# Patient Record
Sex: FEMALE | Race: WHITE | NOT HISPANIC OR LATINO | Employment: OTHER | URBAN - METROPOLITAN AREA
[De-identification: names, ages, dates, MRNs, and addresses within clinical notes are randomized per-mention and may not be internally consistent; named-entity substitution may affect disease eponyms.]

---

## 2017-03-03 ENCOUNTER — CHARTING TRANS (OUTPATIENT)
Dept: CARDIOLOGY | Age: 62
End: 2017-03-03

## 2017-03-03 ENCOUNTER — IMAGING SERVICES (OUTPATIENT)
Dept: OTHER | Age: 62
End: 2017-03-03

## 2017-04-01 ENCOUNTER — LAB SERVICES (OUTPATIENT)
Dept: OTHER | Age: 62
End: 2017-04-01

## 2017-04-01 LAB
ALBUMIN SERPL BCG-MCNC: 4.3 G/DL (ref 3.6–5.1)
ALP SERPL-CCNC: 129 U/L (ref 45–105)
ALT SERPL W/O P-5'-P-CCNC: 48 U/L (ref 15–43)
AST SERPL-CCNC: 40 U/L (ref 14–43)
BILIRUB SERPL-MCNC: 0.8 MG/DL (ref 0–1.3)
BUN SERPL-MCNC: 12 MG/DL (ref 7–20)
CALCIUM SERPL-MCNC: 9.5 MG/DL (ref 8.6–10.6)
CHLORIDE SERPL-SCNC: 100 MMOL/L (ref 96–107)
CHOLEST SERPL-MCNC: 223 MG/DL (ref 140–200)
CREATININE, SERUM: 0.8 MG/DL (ref 0.5–1.4)
GFR SERPL CREATININE-BSD FRML MDRD: >60 ML/MIN/{1.73M2}
GFR SERPL CREATININE-BSD FRML MDRD: >60 ML/MIN/{1.73M2}
GLUCOSE P FAST SERPL-MCNC: 103 MG/DL (ref 60–100)
HCO3 SERPL-SCNC: 28 MMOL/L (ref 22–32)
HDLC SERPL-MCNC: 84 MG/DL
LDLC SERPL CALC-MCNC: 116 MG/DL (ref 30–100)
POTASSIUM SERPL-SCNC: 4.8 MMOL/L (ref 3.5–5.3)
PROT SERPL-MCNC: 7.3 G/DL (ref 6.4–8.5)
SODIUM SERPL-SCNC: 140 MMOL/L (ref 136–146)
TRIGL SERPL-MCNC: 114 MG/DL (ref 0–200)

## 2017-06-07 ENCOUNTER — CHARTING TRANS (OUTPATIENT)
Dept: OTHER | Age: 62
End: 2017-06-07

## 2017-06-07 ENCOUNTER — IMAGING SERVICES (OUTPATIENT)
Dept: OTHER | Age: 62
End: 2017-06-07

## 2017-06-07 ENCOUNTER — CHARTING TRANS (OUTPATIENT)
Dept: URGENT CARE | Age: 62
End: 2017-06-07

## 2018-03-13 ENCOUNTER — IMAGING SERVICES (OUTPATIENT)
Dept: OTHER | Age: 63
End: 2018-03-13

## 2018-03-13 ENCOUNTER — CHARTING TRANS (OUTPATIENT)
Dept: OTHER | Age: 63
End: 2018-03-13

## 2018-04-02 ENCOUNTER — LAB SERVICES (OUTPATIENT)
Dept: OTHER | Age: 63
End: 2018-04-02

## 2018-04-02 LAB
ALBUMIN SERPL BCG-MCNC: 4.5 G/DL (ref 3.6–5.1)
ALP SERPL-CCNC: 114 U/L (ref 45–105)
ALT SERPL W/O P-5'-P-CCNC: 41 U/L (ref 15–43)
AST SERPL-CCNC: 39 U/L (ref 14–43)
BILIRUB SERPL-MCNC: 0.4 MG/DL (ref 0–1.3)
BUN SERPL-MCNC: 18 MG/DL (ref 7–20)
CALCIUM SERPL-MCNC: 9.9 MG/DL (ref 8.6–10.6)
CHLORIDE SERPL-SCNC: 102 MMOL/L (ref 96–107)
CHOLEST SERPL-MCNC: 212 MG/DL (ref 140–200)
CREATININE, SERUM: 0.8 MG/DL (ref 0.5–1.4)
GFR SERPL CREATININE-BSD FRML MDRD: >60 ML/MIN/{1.73M2}
GFR SERPL CREATININE-BSD FRML MDRD: >60 ML/MIN/{1.73M2}
GLUCOSE P FAST SERPL-MCNC: 91 MG/DL (ref 60–100)
HCO3 SERPL-SCNC: 29 MMOL/L (ref 22–32)
HDLC SERPL-MCNC: 81 MG/DL
LDLC SERPL CALC-MCNC: 105 MG/DL (ref 30–100)
POTASSIUM SERPL-SCNC: 4.8 MMOL/L (ref 3.5–5.3)
PROT SERPL-MCNC: 7.7 G/DL (ref 6.4–8.5)
SODIUM SERPL-SCNC: 140 MMOL/L (ref 136–146)
TRIGL SERPL-MCNC: 130 MG/DL (ref 0–200)

## 2018-04-04 ENCOUNTER — CHARTING TRANS (OUTPATIENT)
Dept: OTHER | Age: 63
End: 2018-04-04

## 2018-04-04 ENCOUNTER — IMAGING SERVICES (OUTPATIENT)
Dept: OTHER | Age: 63
End: 2018-04-04

## 2018-04-06 ENCOUNTER — CHARTING TRANS (OUTPATIENT)
Dept: OTHER | Age: 63
End: 2018-04-06

## 2018-11-23 ENCOUNTER — IMAGING SERVICES (OUTPATIENT)
Dept: OTHER | Age: 63
End: 2018-11-23

## 2018-11-28 VITALS
TEMPERATURE: 101.2 F | DIASTOLIC BLOOD PRESSURE: 100 MMHG | SYSTOLIC BLOOD PRESSURE: 184 MMHG | OXYGEN SATURATION: 98 % | HEART RATE: 105 BPM | RESPIRATION RATE: 16 BRPM

## 2018-11-30 VITALS
WEIGHT: 180 LBS | DIASTOLIC BLOOD PRESSURE: 78 MMHG | OXYGEN SATURATION: 98 % | HEART RATE: 77 BPM | HEIGHT: 67 IN | RESPIRATION RATE: 16 BRPM | BODY MASS INDEX: 28.25 KG/M2 | SYSTOLIC BLOOD PRESSURE: 146 MMHG

## 2019-01-01 ENCOUNTER — EXTERNAL RECORD (OUTPATIENT)
Dept: HEALTH INFORMATION MANAGEMENT | Facility: OTHER | Age: 64
End: 2019-01-01

## 2019-01-24 ENCOUNTER — IMAGING SERVICES (OUTPATIENT)
Dept: OTHER | Age: 64
End: 2019-01-24

## 2019-02-14 RX ORDER — METOPROLOL SUCCINATE 25 MG/1
TABLET, EXTENDED RELEASE ORAL
COMMUNITY
Start: 2018-04-13 | End: 2019-05-21 | Stop reason: DRUGHIGH

## 2019-02-14 RX ORDER — ATORVASTATIN CALCIUM 20 MG/1
TABLET, FILM COATED ORAL
COMMUNITY
Start: 2018-04-13 | End: 2019-03-15 | Stop reason: SDUPTHER

## 2019-02-28 ENCOUNTER — TELEPHONE (OUTPATIENT)
Dept: FAMILY MEDICINE | Age: 64
End: 2019-02-28

## 2019-02-28 ENCOUNTER — OFFICE VISIT (OUTPATIENT)
Dept: FAMILY MEDICINE | Age: 64
End: 2019-02-28

## 2019-02-28 VITALS
DIASTOLIC BLOOD PRESSURE: 70 MMHG | BODY MASS INDEX: 24.91 KG/M2 | HEIGHT: 66 IN | HEART RATE: 72 BPM | SYSTOLIC BLOOD PRESSURE: 130 MMHG | WEIGHT: 155 LBS

## 2019-02-28 DIAGNOSIS — I65.23 BILATERAL CAROTID ARTERY STENOSIS: ICD-10-CM

## 2019-02-28 DIAGNOSIS — G93.0 ARACHNOID CYST: ICD-10-CM

## 2019-02-28 DIAGNOSIS — R42 DIZZINESS: Primary | ICD-10-CM

## 2019-02-28 DIAGNOSIS — E78.5 HYPERLIPIDEMIA, UNSPECIFIED HYPERLIPIDEMIA TYPE: ICD-10-CM

## 2019-02-28 DIAGNOSIS — I10 ESSENTIAL HYPERTENSION: ICD-10-CM

## 2019-02-28 PROCEDURE — 99214 OFFICE O/P EST MOD 30 MIN: CPT | Performed by: INTERNAL MEDICINE

## 2019-02-28 PROCEDURE — 3078F DIAST BP <80 MM HG: CPT | Performed by: INTERNAL MEDICINE

## 2019-02-28 ASSESSMENT — PATIENT HEALTH QUESTIONNAIRE - PHQ9
1. LITTLE INTEREST OR PLEASURE IN DOING THINGS: NOT AT ALL
2. FEELING DOWN, DEPRESSED OR HOPELESS: NOT AT ALL
SUM OF ALL RESPONSES TO PHQ9 QUESTIONS 1 AND 2: 0
SUM OF ALL RESPONSES TO PHQ9 QUESTIONS 1 AND 2: 0

## 2019-03-01 ENCOUNTER — TELEPHONE (OUTPATIENT)
Dept: FAMILY MEDICINE | Age: 64
End: 2019-03-01

## 2019-03-01 ENCOUNTER — LAB SERVICES (OUTPATIENT)
Dept: LAB | Age: 64
End: 2019-03-01

## 2019-03-01 DIAGNOSIS — R42 DIZZINESS: ICD-10-CM

## 2019-03-01 DIAGNOSIS — I10 ESSENTIAL HYPERTENSION: ICD-10-CM

## 2019-03-01 LAB
ALBUMIN SERPL-MCNC: 4.6 G/DL (ref 3.6–5.1)
ALP SERPL-CCNC: 81 U/L (ref 45–130)
ALT SERPL W/O P-5'-P-CCNC: 24 U/L (ref 4–38)
AST SERPL-CCNC: 32 U/L (ref 14–43)
BASOPHIL %: 0.3 % (ref 0–1.2)
BASOPHIL ABSOLUTE #: 0 10*3/UL (ref 0–0.1)
BILIRUB SERPL-MCNC: 1 MG/DL (ref 0–1.3)
BUN SERPL-MCNC: 27 MG/DL (ref 7–20)
CALCIUM SERPL-MCNC: 10.1 MG/DL (ref 8.6–10.6)
CHLORIDE SERPL-SCNC: 101 MMOL/L (ref 96–107)
CO2 SERPL-SCNC: 30 MMOL/L (ref 22–32)
CREAT SERPL-MCNC: 0.8 MG/DL (ref 0.5–1.4)
DIFFERENTIAL TYPE: ABNORMAL
EOSINOPHIL %: 1.5 % (ref 0–10)
EOSINOPHIL ABSOLUTE #: 0.1 10*3/UL (ref 0–0.5)
GFR SERPL CREATININE-BSD FRML MDRD: >60 ML/MIN/{1.73M2}
GFR SERPL CREATININE-BSD FRML MDRD: >60 ML/MIN/{1.73M2}
GLUCOSE SERPL-MCNC: 102 MG/DL (ref 70–200)
HEMATOCRIT: 43.3 % (ref 34–45)
HEMOGLOBIN: 14.8 G/DL (ref 11.2–15.7)
LYMPH PERCENT: 25.1 % (ref 20.5–51.1)
LYMPHOCYTE ABSOLUTE #: 2.2 10*3/UL (ref 1.2–3.4)
MEAN CORPUSCULAR HGB CONCENTRATION: 34.2 % (ref 32–36)
MEAN CORPUSCULAR HGB: 32.2 PG (ref 27–34)
MEAN CORPUSCULAR VOLUME: 94.1 FL (ref 79–95)
MEAN PLATELET VOLUME: 9.6 FL (ref 8.6–12.4)
MONOCYTE ABSOLUTE #: 1 10*3/UL (ref 0.2–0.9)
MONOCYTE PERCENT: 12 % (ref 4.3–12.9)
NEUTROPHIL ABSOLUTE #: 5.3 10*3/UL (ref 1.4–6.5)
NEUTROPHIL PERCENT: 61.1 % (ref 34–73.5)
PLATELET COUNT: 253 10*3/UL (ref 150–400)
POTASSIUM SERPL-SCNC: 4 MMOL/L (ref 3.5–5.3)
PROT SERPL-MCNC: 7.8 G/DL (ref 6.4–8.5)
RED BLOOD CELL COUNT: 4.6 10*6/UL (ref 3.7–5.2)
RED CELL DISTRIBUTION WIDTH: 11.6 % (ref 11.3–14.8)
SODIUM SERPL-SCNC: 137 MMOL/L (ref 136–146)
WHITE BLOOD CELL COUNT: 8.6 10*3/UL (ref 4–10)

## 2019-03-01 PROCEDURE — 85025 COMPLETE CBC W/AUTO DIFF WBC: CPT | Performed by: INTERNAL MEDICINE

## 2019-03-01 PROCEDURE — 80053 COMPREHEN METABOLIC PANEL: CPT | Performed by: INTERNAL MEDICINE

## 2019-03-01 PROCEDURE — 36415 COLL VENOUS BLD VENIPUNCTURE: CPT | Performed by: INTERNAL MEDICINE

## 2019-03-06 ENCOUNTER — ANCILLARY PROCEDURE (OUTPATIENT)
Dept: CARDIOLOGY | Age: 64
End: 2019-03-06
Attending: INTERNAL MEDICINE

## 2019-03-06 VITALS
RESPIRATION RATE: 16 BRPM | SYSTOLIC BLOOD PRESSURE: 132 MMHG | WEIGHT: 168 LBS | DIASTOLIC BLOOD PRESSURE: 78 MMHG | BODY MASS INDEX: 26.37 KG/M2 | OXYGEN SATURATION: 96 % | HEART RATE: 79 BPM | HEIGHT: 67 IN

## 2019-03-06 DIAGNOSIS — R42 DIZZINESS: ICD-10-CM

## 2019-03-06 DIAGNOSIS — G93.0 ARACHNOID CYST: ICD-10-CM

## 2019-03-06 DIAGNOSIS — I65.23 BILATERAL CAROTID ARTERY STENOSIS: ICD-10-CM

## 2019-03-06 PROCEDURE — 93880 EXTRACRANIAL BILAT STUDY: CPT | Performed by: SURGERY

## 2019-03-11 ENCOUNTER — TELEPHONE (OUTPATIENT)
Dept: FAMILY MEDICINE | Age: 64
End: 2019-03-11

## 2019-03-11 DIAGNOSIS — R11.0 NAUSEA: Primary | ICD-10-CM

## 2019-03-11 DIAGNOSIS — R19.5 LOOSE STOOLS: ICD-10-CM

## 2019-03-15 ENCOUNTER — OFFICE VISIT (OUTPATIENT)
Dept: CARDIOLOGY | Age: 64
End: 2019-03-15

## 2019-03-15 VITALS
SYSTOLIC BLOOD PRESSURE: 136 MMHG | BODY MASS INDEX: 24.75 KG/M2 | HEIGHT: 66 IN | RESPIRATION RATE: 16 BRPM | OXYGEN SATURATION: 100 % | HEART RATE: 81 BPM | DIASTOLIC BLOOD PRESSURE: 84 MMHG | WEIGHT: 154 LBS

## 2019-03-15 DIAGNOSIS — I25.10 CORONARY ARTERY DISEASE INVOLVING NATIVE CORONARY ARTERY OF NATIVE HEART WITHOUT ANGINA PECTORIS: Primary | ICD-10-CM

## 2019-03-15 DIAGNOSIS — E78.49 OTHER HYPERLIPIDEMIA: ICD-10-CM

## 2019-03-15 DIAGNOSIS — R42 DIZZINESS: ICD-10-CM

## 2019-03-15 PROCEDURE — 93000 ELECTROCARDIOGRAM COMPLETE: CPT | Performed by: INTERNAL MEDICINE

## 2019-03-15 PROCEDURE — 3075F SYST BP GE 130 - 139MM HG: CPT | Performed by: INTERNAL MEDICINE

## 2019-03-15 PROCEDURE — 99214 OFFICE O/P EST MOD 30 MIN: CPT | Performed by: INTERNAL MEDICINE

## 2019-03-15 PROCEDURE — 3079F DIAST BP 80-89 MM HG: CPT | Performed by: INTERNAL MEDICINE

## 2019-03-15 RX ORDER — ATORVASTATIN CALCIUM 40 MG/1
40 TABLET, FILM COATED ORAL NIGHTLY
Qty: 90 TABLET | Refills: 3 | Status: SHIPPED | OUTPATIENT
Start: 2019-03-15 | End: 2020-03-07 | Stop reason: SDUPTHER

## 2019-03-18 ENCOUNTER — IMAGING SERVICES (OUTPATIENT)
Dept: MRI IMAGING | Age: 64
End: 2019-03-18

## 2019-03-18 ENCOUNTER — IMAGING SERVICES (OUTPATIENT)
Dept: CT IMAGING | Age: 64
End: 2019-03-18

## 2019-03-20 ENCOUNTER — IMAGING SERVICES (OUTPATIENT)
Dept: MRI IMAGING | Age: 64
End: 2019-03-20

## 2019-03-20 DIAGNOSIS — I65.23 BILATERAL CAROTID ARTERY STENOSIS: ICD-10-CM

## 2019-03-20 DIAGNOSIS — G93.0 ARACHNOID CYST: ICD-10-CM

## 2019-03-20 DIAGNOSIS — R42 DIZZINESS: ICD-10-CM

## 2019-03-20 PROCEDURE — X1094 NO CHARGE VISIT: HCPCS

## 2019-03-20 PROCEDURE — 70544 MR ANGIOGRAPHY HEAD W/O DYE: CPT | Performed by: RADIOLOGY

## 2019-03-20 PROCEDURE — A9585 GADOBUTROL INJECTION: HCPCS | Performed by: RADIOLOGY

## 2019-03-20 PROCEDURE — 70553 MRI BRAIN STEM W/O & W/DYE: CPT | Performed by: RADIOLOGY

## 2019-03-20 RX ORDER — GADOBUTROL 604.72 MG/ML
30 INJECTION INTRAVENOUS ONCE
Status: COMPLETED | OUTPATIENT
Start: 2019-03-20 | End: 2019-03-20

## 2019-03-20 RX ADMIN — GADOBUTROL 7 ML: 604.72 INJECTION INTRAVENOUS at 07:45

## 2019-03-27 ENCOUNTER — LAB SERVICES (OUTPATIENT)
Dept: LAB | Age: 64
End: 2019-03-27

## 2019-03-27 DIAGNOSIS — E78.49 OTHER HYPERLIPIDEMIA: ICD-10-CM

## 2019-03-27 DIAGNOSIS — I25.10 CORONARY ARTERY DISEASE INVOLVING NATIVE CORONARY ARTERY OF NATIVE HEART WITHOUT ANGINA PECTORIS: ICD-10-CM

## 2019-03-27 LAB
CHOLEST SERPL-MCNC: 226 MG/DL (ref 140–200)
HDLC SERPL-MCNC: 92 MG/DL
LDLC SERPL CALC-MCNC: 112 MG/DL (ref 30–100)
TRIGL SERPL-MCNC: 112 MG/DL (ref 0–200)

## 2019-03-27 PROCEDURE — 36415 COLL VENOUS BLD VENIPUNCTURE: CPT | Performed by: INTERNAL MEDICINE

## 2019-03-27 PROCEDURE — 80061 LIPID PANEL: CPT | Performed by: INTERNAL MEDICINE

## 2019-03-28 ENCOUNTER — ANCILLARY PROCEDURE (OUTPATIENT)
Dept: CARDIOLOGY | Age: 64
End: 2019-03-28
Attending: INTERNAL MEDICINE

## 2019-03-28 PROCEDURE — 93306 TTE W/DOPPLER COMPLETE: CPT | Performed by: INTERNAL MEDICINE

## 2019-03-29 DIAGNOSIS — E78.5 HYPERLIPIDEMIA, UNSPECIFIED HYPERLIPIDEMIA TYPE: Primary | ICD-10-CM

## 2019-03-31 ENCOUNTER — TELEPHONE (OUTPATIENT)
Dept: CARDIOLOGY | Age: 64
End: 2019-03-31

## 2019-04-10 ENCOUNTER — OFFICE VISIT (OUTPATIENT)
Dept: GASTROENTEROLOGY | Age: 64
End: 2019-04-10

## 2019-04-10 ENCOUNTER — OFFICE VISIT (OUTPATIENT)
Dept: FAMILY MEDICINE | Age: 64
End: 2019-04-10

## 2019-04-10 VITALS
WEIGHT: 159 LBS | HEIGHT: 66 IN | DIASTOLIC BLOOD PRESSURE: 74 MMHG | BODY MASS INDEX: 25.55 KG/M2 | SYSTOLIC BLOOD PRESSURE: 118 MMHG

## 2019-04-10 VITALS
SYSTOLIC BLOOD PRESSURE: 142 MMHG | HEIGHT: 66 IN | DIASTOLIC BLOOD PRESSURE: 80 MMHG | BODY MASS INDEX: 25.55 KG/M2 | HEART RATE: 68 BPM | WEIGHT: 159 LBS

## 2019-04-10 DIAGNOSIS — Z01.419 WELL WOMAN EXAM: ICD-10-CM

## 2019-04-10 DIAGNOSIS — Z12.31 VISIT FOR SCREENING MAMMOGRAM: Primary | ICD-10-CM

## 2019-04-10 DIAGNOSIS — Z23 NEED FOR TDAP VACCINATION: ICD-10-CM

## 2019-04-10 DIAGNOSIS — Z86.010 PERSONAL HISTORY OF COLONIC POLYPS: ICD-10-CM

## 2019-04-10 DIAGNOSIS — I65.23 BILATERAL CAROTID ARTERY STENOSIS: ICD-10-CM

## 2019-04-10 DIAGNOSIS — R11.0 CHRONIC NAUSEA: Primary | ICD-10-CM

## 2019-04-10 DIAGNOSIS — R42 VERTIGO: ICD-10-CM

## 2019-04-10 DIAGNOSIS — R73.01 IMPAIRED FASTING GLUCOSE: ICD-10-CM

## 2019-04-10 DIAGNOSIS — E78.5 HYPERLIPIDEMIA, UNSPECIFIED HYPERLIPIDEMIA TYPE: ICD-10-CM

## 2019-04-10 DIAGNOSIS — F10.10 EXCESSIVE DRINKING ALCOHOL: ICD-10-CM

## 2019-04-10 DIAGNOSIS — N95.1 SYMPTOMATIC MENOPAUSAL OR FEMALE CLIMACTERIC STATES: ICD-10-CM

## 2019-04-10 DIAGNOSIS — I10 ESSENTIAL HYPERTENSION: ICD-10-CM

## 2019-04-10 PROCEDURE — 90471 IMMUNIZATION ADMIN: CPT

## 2019-04-10 PROCEDURE — 88142 CYTOPATH C/V THIN LAYER: CPT | Performed by: INTERNAL MEDICINE

## 2019-04-10 PROCEDURE — 99214 OFFICE O/P EST MOD 30 MIN: CPT | Performed by: INTERNAL MEDICINE

## 2019-04-10 PROCEDURE — 3079F DIAST BP 80-89 MM HG: CPT | Performed by: INTERNAL MEDICINE

## 2019-04-10 PROCEDURE — 3078F DIAST BP <80 MM HG: CPT | Performed by: INTERNAL MEDICINE

## 2019-04-10 PROCEDURE — 99396 PREV VISIT EST AGE 40-64: CPT | Performed by: INTERNAL MEDICINE

## 2019-04-10 PROCEDURE — 3077F SYST BP >= 140 MM HG: CPT | Performed by: INTERNAL MEDICINE

## 2019-04-10 PROCEDURE — 90715 TDAP VACCINE 7 YRS/> IM: CPT

## 2019-04-10 PROCEDURE — 3074F SYST BP LT 130 MM HG: CPT | Performed by: INTERNAL MEDICINE

## 2019-04-10 RX ORDER — SODIUM, POTASSIUM,MAG SULFATES 17.5-3.13G
SOLUTION, RECONSTITUTED, ORAL ORAL
Qty: 12 OZ | Refills: 0 | Status: SHIPPED | OUTPATIENT
Start: 2019-04-10 | End: 2019-08-31

## 2019-04-10 RX ORDER — SIMETHICONE 125 MG
TABLET,CHEWABLE ORAL
Qty: 2 TABLET | Refills: 0 | Status: SHIPPED | OUTPATIENT
Start: 2019-04-10 | End: 2019-08-31

## 2019-04-10 RX ORDER — BISACODYL 5 MG/1
TABLET, DELAYED RELEASE ORAL
Qty: 2 TABLET | Refills: 0 | Status: SHIPPED | OUTPATIENT
Start: 2019-04-10 | End: 2019-08-31

## 2019-04-10 ASSESSMENT — PATIENT HEALTH QUESTIONNAIRE - PHQ9
1. LITTLE INTEREST OR PLEASURE IN DOING THINGS: NOT AT ALL
SUM OF ALL RESPONSES TO PHQ9 QUESTIONS 1 AND 2: 0
2. FEELING DOWN, DEPRESSED OR HOPELESS: NOT AT ALL
SUM OF ALL RESPONSES TO PHQ9 QUESTIONS 1 AND 2: 0

## 2019-04-19 LAB — AP REPORT: NORMAL

## 2019-04-23 LAB — HPV I/H RISK 4 DNA CVX QL NAA+PROBE: NORMAL

## 2019-04-30 ENCOUNTER — TELEPHONE (OUTPATIENT)
Dept: FAMILY MEDICINE | Age: 64
End: 2019-04-30

## 2019-04-30 RX ORDER — ONDANSETRON 4 MG/1
4 TABLET, FILM COATED ORAL EVERY 8 HOURS PRN
Qty: 20 TABLET | Refills: 0 | Status: SHIPPED | OUTPATIENT
Start: 2019-04-30 | End: 2022-01-13 | Stop reason: ALTCHOICE

## 2019-05-01 ENCOUNTER — TELEPHONE (OUTPATIENT)
Dept: FAMILY MEDICINE | Age: 64
End: 2019-05-01

## 2019-05-15 ENCOUNTER — WALK IN (OUTPATIENT)
Dept: URGENT CARE | Age: 64
End: 2019-05-15

## 2019-05-15 VITALS
HEART RATE: 94 BPM | RESPIRATION RATE: 24 BRPM | SYSTOLIC BLOOD PRESSURE: 198 MMHG | OXYGEN SATURATION: 99 % | DIASTOLIC BLOOD PRESSURE: 118 MMHG | TEMPERATURE: 98.3 F

## 2019-05-15 DIAGNOSIS — R42 VERTIGO: Primary | ICD-10-CM

## 2019-05-15 DIAGNOSIS — I16.0 HYPERTENSIVE URGENCY: ICD-10-CM

## 2019-05-15 DIAGNOSIS — R11.2 NAUSEA AND VOMITING, INTRACTABILITY OF VOMITING NOT SPECIFIED, UNSPECIFIED VOMITING TYPE: ICD-10-CM

## 2019-05-15 DIAGNOSIS — E86.0 DEHYDRATION: ICD-10-CM

## 2019-05-15 PROCEDURE — 99214 OFFICE O/P EST MOD 30 MIN: CPT | Performed by: FAMILY MEDICINE

## 2019-05-15 PROCEDURE — 96374 THER/PROPH/DIAG INJ IV PUSH: CPT | Performed by: FAMILY MEDICINE

## 2019-05-15 PROCEDURE — 96360 HYDRATION IV INFUSION INIT: CPT | Performed by: FAMILY MEDICINE

## 2019-05-15 RX ORDER — ONDANSETRON 2 MG/ML
4 INJECTION INTRAMUSCULAR; INTRAVENOUS ONCE
Status: COMPLETED | OUTPATIENT
Start: 2019-05-15 | End: 2019-05-15

## 2019-05-15 RX ADMIN — ONDANSETRON 4 MG: 2 INJECTION INTRAMUSCULAR; INTRAVENOUS at 10:08

## 2019-05-16 ENCOUNTER — EXTERNAL RECORD (OUTPATIENT)
Dept: OTHER | Age: 64
End: 2019-05-16

## 2019-05-16 LAB
*MEAN CORPUSCULAR HGB CONC: 34.6 G/DL (ref 32.5–35.8)
ANION GAP: 11 MEQ/L (ref 8–16)
BASOPHIL AUTOMATED: 0.2 %
BASOPHILS: 0 (ref 0–0.2)
BLOOD UREA NITROGEN (BUN): 18 MG/DL (ref 7–25)
BUN/CREATININE RATIO: 30.5 (ref 7.4–23)
CALCIUM, SERUM: 9.1 MG/DL (ref 8.6–10.3)
CARBON DIOXIDE: 23 MMOL/L (ref 21–31)
CHLORIDE, SERUM: 103 MMOL/L (ref 98–107)
CREATININE: 0.59 MG/DL (ref 0.6–1.2)
EOSINOPHIL AUTOMATED: 0 %
EOSINOPHILS: 0 (ref 0–0.5)
EST GLOMERULAR FILTRATION RATE: > 60 1.73M SQ
GLUCOSE: 124 MG/DL (ref 70–99)
HEMATOCRIT: 42 % (ref 34.7–45.1)
HEMOGLOBIN: 14.5 GM/DL (ref 12–15.3)
K (POTASSIUM, SERUM): 3.9 MMOL/L (ref 3.5–5.1)
LYMPHOCYTE AUTOMATED: 16.5 %
LYMPHOCYTES: 0.9 (ref 0.6–3.4)
MEAN CORPUSCULAR HGB: 31.8 PG (ref 26–34)
MEAN CORPUSCULAR VOL: 91.9 FL (ref 80–100)
MEAN PLATELET VOLUME: 7.4 FL (ref 6.8–10.2)
MONOCYTE AUTOMATED: 3.6 %
MONOCYTES: 0.2 (ref 0.3–1)
NA (SODIUM, SERUM): 137 MMOL/L (ref 133–144)
NEUTROPHIL ABSOLUTE: 4.4 (ref 1.7–7.7)
NEUTROPHIL AUTOMATED: 79.7 %
PLATELET COUNT: 242 K/MM3 (ref 150–450)
RED BLOOD CELL COUNT: 4.57 M/MM3 (ref 3.63–5.04)
RED CELL DISTRIBUTIO: 12.4 % (ref 11.9–15.9)
WHITE BLOOD CELL COU: 5.5 K/MM3 (ref 4–11)

## 2019-05-17 ENCOUNTER — TELEPHONE (OUTPATIENT)
Dept: INTERNAL MEDICINE | Age: 64
End: 2019-05-17

## 2019-05-17 ENCOUNTER — TELEPHONE (OUTPATIENT)
Dept: OTOLARYNGOLOGY | Age: 64
End: 2019-05-17

## 2019-05-20 ENCOUNTER — TELEPHONE (OUTPATIENT)
Dept: FAMILY MEDICINE | Age: 64
End: 2019-05-20

## 2019-05-21 ENCOUNTER — OFFICE VISIT (OUTPATIENT)
Dept: FAMILY MEDICINE | Age: 64
End: 2019-05-21

## 2019-05-21 VITALS
DIASTOLIC BLOOD PRESSURE: 72 MMHG | BODY MASS INDEX: 25.88 KG/M2 | HEART RATE: 82 BPM | WEIGHT: 161 LBS | HEIGHT: 66 IN | SYSTOLIC BLOOD PRESSURE: 136 MMHG

## 2019-05-21 DIAGNOSIS — R42 VERTIGO: ICD-10-CM

## 2019-05-21 DIAGNOSIS — I10 ESSENTIAL HYPERTENSION: Primary | ICD-10-CM

## 2019-05-21 DIAGNOSIS — I65.29 STENOSIS OF CAROTID ARTERY, UNSPECIFIED LATERALITY: ICD-10-CM

## 2019-05-21 PROCEDURE — 3078F DIAST BP <80 MM HG: CPT | Performed by: INTERNAL MEDICINE

## 2019-05-21 PROCEDURE — 99214 OFFICE O/P EST MOD 30 MIN: CPT | Performed by: INTERNAL MEDICINE

## 2019-05-21 PROCEDURE — 3075F SYST BP GE 130 - 139MM HG: CPT | Performed by: INTERNAL MEDICINE

## 2019-05-21 RX ORDER — MECLIZINE HYDROCHLORIDE 25 MG/1
25 TABLET ORAL 3 TIMES DAILY PRN
Qty: 40 TABLET | Refills: 0 | Status: SHIPPED | OUTPATIENT
Start: 2019-05-21 | End: 2023-02-02 | Stop reason: ALTCHOICE

## 2019-05-21 RX ORDER — METOPROLOL SUCCINATE 50 MG/1
50 TABLET, EXTENDED RELEASE ORAL DAILY
Qty: 90 TABLET | Refills: 2 | Status: SHIPPED | OUTPATIENT
Start: 2019-05-21 | End: 2020-02-13 | Stop reason: SDUPTHER

## 2019-05-23 ENCOUNTER — APPOINTMENT (OUTPATIENT)
Dept: FAMILY MEDICINE | Age: 64
End: 2019-05-23

## 2019-05-23 ENCOUNTER — APPOINTMENT (OUTPATIENT)
Dept: OTOLARYNGOLOGY | Age: 64
End: 2019-05-23

## 2019-05-23 ENCOUNTER — OFFICE VISIT (OUTPATIENT)
Dept: OTOLARYNGOLOGY | Age: 64
End: 2019-05-23

## 2019-05-23 ENCOUNTER — OFFICE VISIT (OUTPATIENT)
Dept: AUDIOLOGY | Age: 64
End: 2019-05-23

## 2019-05-23 VITALS — WEIGHT: 161 LBS | BODY MASS INDEX: 25.88 KG/M2 | HEIGHT: 66 IN

## 2019-05-23 DIAGNOSIS — R42 DIZZINESS: ICD-10-CM

## 2019-05-23 DIAGNOSIS — J34.2 DEVIATED NASAL SEPTUM: ICD-10-CM

## 2019-05-23 DIAGNOSIS — H90.3 SENSORINEURAL HEARING LOSS (SNHL) OF BOTH EARS: Primary | ICD-10-CM

## 2019-05-23 DIAGNOSIS — R42 VERTIGO: ICD-10-CM

## 2019-05-23 DIAGNOSIS — H91.90 HEARING LOSS, UNSPECIFIED HEARING LOSS TYPE, UNSPECIFIED LATERALITY: Primary | ICD-10-CM

## 2019-05-23 PROCEDURE — 92567 TYMPANOMETRY: CPT | Performed by: AUDIOLOGIST

## 2019-05-23 PROCEDURE — 3078F DIAST BP <80 MM HG: CPT | Performed by: OTOLARYNGOLOGY

## 2019-05-23 PROCEDURE — 99203 OFFICE O/P NEW LOW 30 MIN: CPT | Performed by: OTOLARYNGOLOGY

## 2019-05-23 PROCEDURE — 3074F SYST BP LT 130 MM HG: CPT | Performed by: OTOLARYNGOLOGY

## 2019-05-23 PROCEDURE — 92557 COMPREHENSIVE HEARING TEST: CPT | Performed by: AUDIOLOGIST

## 2019-06-06 ENCOUNTER — OFFICE VISIT (OUTPATIENT)
Dept: NEUROLOGY | Facility: CLINIC | Age: 64
End: 2019-06-06
Payer: COMMERCIAL

## 2019-06-06 ENCOUNTER — TELEPHONE (OUTPATIENT)
Dept: NEUROLOGY | Facility: CLINIC | Age: 64
End: 2019-06-06

## 2019-06-06 VITALS — DIASTOLIC BLOOD PRESSURE: 72 MMHG | SYSTOLIC BLOOD PRESSURE: 127 MMHG | WEIGHT: 155 LBS

## 2019-06-06 DIAGNOSIS — H83.8X3 SUPERIOR SEMICIRCULAR CANAL DEHISCENCE OF BOTH EARS: ICD-10-CM

## 2019-06-06 DIAGNOSIS — H70.92 MASTOIDITIS OF LEFT SIDE: ICD-10-CM

## 2019-06-06 DIAGNOSIS — H81.13 BENIGN PAROXYSMAL POSITIONAL VERTIGO DUE TO BILATERAL VESTIBULAR DISORDER: Primary | ICD-10-CM

## 2019-06-06 PROCEDURE — 99204 OFFICE O/P NEW MOD 45 MIN: CPT | Performed by: OTHER

## 2019-06-06 RX ORDER — METOPROLOL SUCCINATE 50 MG/1
50 TABLET, EXTENDED RELEASE ORAL
COMMUNITY
Start: 2019-05-21

## 2019-06-06 RX ORDER — ATORVASTATIN CALCIUM 20 MG/1
40 TABLET, FILM COATED ORAL
Refills: 3 | COMMUNITY
Start: 2019-01-07 | End: 2019-07-19 | Stop reason: DRUGHIGH

## 2019-06-06 RX ORDER — DIAZEPAM 2 MG/1
2 TABLET ORAL 3 TIMES DAILY PRN
Qty: 30 TABLET | Refills: 3 | Status: SHIPPED | OUTPATIENT
Start: 2019-06-06 | End: 2022-01-07

## 2019-06-06 RX ORDER — SIMETHICONE 125 MG
125 TABLET,CHEWABLE ORAL
COMMUNITY
Start: 2019-04-10 | End: 2019-07-19

## 2019-06-06 RX ORDER — MECLIZINE HYDROCHLORIDE 25 MG/1
25 TABLET ORAL
COMMUNITY
Start: 2019-05-21 | End: 2019-07-19

## 2019-06-06 RX ORDER — ONDANSETRON 4 MG/1
4 TABLET, ORALLY DISINTEGRATING ORAL
Refills: 0 | COMMUNITY
Start: 2019-02-27 | End: 2020-02-21

## 2019-06-06 RX ORDER — OMEGA-3-ACID ETHYL ESTERS 1 G/1
1 CAPSULE, LIQUID FILLED ORAL DAILY
COMMUNITY

## 2019-06-06 NOTE — H&P
[address] [place] [address] with [facility]  6/6/2019    4:32 PM      The patient is a 21-year-old female who comes in for evaluation of her dizziness.   Several years ago she experienced a 2-week prolonged vertiginous Number of children: Not on file      Years of education: Not on file      Highest education level: Not on file    Tobacco Use      Smoking status: Never Smoker      Smokeless tobacco: Never Used    Substance and Sexual Activity      Alcohol use:  Yes

## 2019-06-10 ENCOUNTER — TELEPHONE (OUTPATIENT)
Dept: NEUROLOGY | Facility: CLINIC | Age: 64
End: 2019-06-10

## 2019-06-11 NOTE — TELEPHONE ENCOUNTER
Pts MRI prior authorization approved. Pt will contact Pamela Harrison to get fax number for us to fax orders.     Fax:  [phone]  Fax sent  Fax confirmed

## 2019-06-14 ENCOUNTER — APPOINTMENT (OUTPATIENT)
Dept: GASTROENTEROLOGY | Age: 64
End: 2019-06-14
Attending: INTERNAL MEDICINE

## 2019-06-14 DIAGNOSIS — Z86.010 PERSONAL HISTORY OF COLONIC POLYPS: ICD-10-CM

## 2019-06-14 DIAGNOSIS — Z86.010 HISTORY OF COLONIC POLYPS: ICD-10-CM

## 2019-06-14 DIAGNOSIS — R11.0 NAUSEA: ICD-10-CM

## 2019-06-14 DIAGNOSIS — D12.3 BENIGN NEOPLASM OF TRANSVERSE COLON: ICD-10-CM

## 2019-06-14 DIAGNOSIS — R11.0 NAUSEA: Primary | ICD-10-CM

## 2019-06-14 PROCEDURE — 88305 TISSUE EXAM BY PATHOLOGIST: CPT | Performed by: PATHOLOGY

## 2019-06-14 PROCEDURE — 45380 COLONOSCOPY AND BIOPSY: CPT | Performed by: INTERNAL MEDICINE

## 2019-06-14 PROCEDURE — 43239 EGD BIOPSY SINGLE/MULTIPLE: CPT | Performed by: INTERNAL MEDICINE

## 2019-06-18 ENCOUNTER — IMAGING SERVICES (OUTPATIENT)
Dept: MRI IMAGING | Age: 64
End: 2019-06-18

## 2019-06-18 DIAGNOSIS — H83.8X3 SUPERIOR SEMICIRCULAR CANAL DEHISCENCE OF BOTH EARS: ICD-10-CM

## 2019-06-18 DIAGNOSIS — H81.13 BENIGN PAROXYSMAL POSITIONAL VERTIGO DUE TO BILATERAL VESTIBULAR DISORDER: ICD-10-CM

## 2019-06-18 DIAGNOSIS — H70.92 MASTOIDITIS OF LEFT SIDE: ICD-10-CM

## 2019-06-18 PROCEDURE — A9585 GADOBUTROL INJECTION: HCPCS

## 2019-06-18 PROCEDURE — 70553 MRI BRAIN STEM W/O & W/DYE: CPT | Performed by: RADIOLOGY

## 2019-06-18 RX ORDER — GADOBUTROL 604.72 MG/ML
30 INJECTION INTRAVENOUS ONCE
Status: COMPLETED | OUTPATIENT
Start: 2019-06-18 | End: 2019-06-18

## 2019-06-18 RX ADMIN — GADOBUTROL 7 ML: 604.72 INJECTION INTRAVENOUS at 17:30

## 2019-06-19 ENCOUNTER — TELEPHONE (OUTPATIENT)
Dept: NEUROLOGY | Facility: CLINIC | Age: 64
End: 2019-06-19

## 2019-06-19 LAB — AP REPORT: NORMAL

## 2019-06-19 NOTE — TELEPHONE ENCOUNTER
I am in remote location, can you past or paraphrase the Impression? So I can call from here.   Dr Karina Chandler

## 2019-06-20 ENCOUNTER — TELEPHONE (OUTPATIENT)
Dept: GASTROENTEROLOGY | Age: 64
End: 2019-06-20

## 2019-07-09 ENCOUNTER — LAB SERVICES (OUTPATIENT)
Dept: LAB | Age: 64
End: 2019-07-09

## 2019-07-09 DIAGNOSIS — E78.5 HYPERLIPIDEMIA, UNSPECIFIED HYPERLIPIDEMIA TYPE: ICD-10-CM

## 2019-07-09 DIAGNOSIS — Z01.419 WELL WOMAN EXAM: ICD-10-CM

## 2019-07-09 LAB
ALBUMIN SERPL-MCNC: 4.4 G/DL (ref 3.6–5.1)
ALP SERPL-CCNC: 123 U/L (ref 45–130)
ALT SERPL W/O P-5'-P-CCNC: 26 U/L (ref 4–38)
AST SERPL-CCNC: 34 U/L (ref 14–43)
BILIRUB CONJ SERPL-MCNC: 0 MG/DL (ref 0–0.3)
BILIRUB SERPL-MCNC: 0.8 MG/DL (ref 0–1.3)
CHOLEST SERPL-MCNC: 223 MG/DL (ref 140–200)
EST. AVERAGE GLUCOSE BLD GHB EST-MCNC: 100 MG/DL (ref 0–154)
HBA1C MFR BLD: 5.1 % (ref 4.2–6)
HDLC SERPL-MCNC: 110 MG/DL
LDLC SERPL CALC-MCNC: 87 MG/DL (ref 30–100)
PROT SERPL-MCNC: 7.6 G/DL (ref 6.4–8.5)
T4 FREE SERPL-MCNC: 0.79 NG/DL (ref 0.78–2.19)
TRIGL SERPL-MCNC: 130 MG/DL (ref 0–200)
TSH SERPL DL<=0.05 MIU/L-ACNC: 5.93 M[IU]/L (ref 0.3–4.82)

## 2019-07-09 PROCEDURE — 84439 ASSAY OF FREE THYROXINE: CPT | Performed by: INTERNAL MEDICINE

## 2019-07-09 PROCEDURE — 80061 LIPID PANEL: CPT | Performed by: INTERNAL MEDICINE

## 2019-07-09 PROCEDURE — 84443 ASSAY THYROID STIM HORMONE: CPT | Performed by: INTERNAL MEDICINE

## 2019-07-09 PROCEDURE — 80076 HEPATIC FUNCTION PANEL: CPT | Performed by: INTERNAL MEDICINE

## 2019-07-09 PROCEDURE — 36415 COLL VENOUS BLD VENIPUNCTURE: CPT | Performed by: INTERNAL MEDICINE

## 2019-07-09 PROCEDURE — 83036 HEMOGLOBIN GLYCOSYLATED A1C: CPT | Performed by: INTERNAL MEDICINE

## 2019-07-19 ENCOUNTER — OFFICE VISIT (OUTPATIENT)
Dept: NEUROLOGY | Facility: CLINIC | Age: 64
End: 2019-07-19
Payer: COMMERCIAL

## 2019-07-19 VITALS
WEIGHT: 155 LBS | BODY MASS INDEX: 25.52 KG/M2 | HEIGHT: 65.5 IN | RESPIRATION RATE: 16 BRPM | HEART RATE: 76 BPM | SYSTOLIC BLOOD PRESSURE: 126 MMHG | DIASTOLIC BLOOD PRESSURE: 70 MMHG

## 2019-07-19 DIAGNOSIS — H81.13 BENIGN PAROXYSMAL POSITIONAL VERTIGO DUE TO BILATERAL VESTIBULAR DISORDER: Primary | ICD-10-CM

## 2019-07-19 PROCEDURE — 99213 OFFICE O/P EST LOW 20 MIN: CPT | Performed by: OTHER

## 2019-07-19 RX ORDER — OMEPRAZOLE 20 MG/1
20 CAPSULE, DELAYED RELEASE ORAL
COMMUNITY
End: 2019-11-21 | Stop reason: ALTCHOICE

## 2019-07-19 RX ORDER — MECLIZINE HYDROCHLORIDE 25 MG/1
25 TABLET ORAL 3 TIMES DAILY PRN
Qty: 30 TABLET | Refills: 3 | Status: SHIPPED | OUTPATIENT
Start: 2019-07-19 | End: 2020-12-07

## 2019-07-19 RX ORDER — ATORVASTATIN CALCIUM 40 MG/1
40 TABLET, FILM COATED ORAL NIGHTLY
COMMUNITY

## 2019-07-19 NOTE — PROGRESS NOTES
[facility] with Kervin  1/08/4249  Primary Care Provider:  Meka Singer    7/19/2019  Accompanied visit:     () No.        71year old yo patient being seen for:  Kassandra Meredith tablet, Rfl: 3  PRN:     Allergies:  Not on File         EXAM:  /70 (BP Location: Left arm, Patient Position: Sitting, Cuff Size: adult)   Pulse 76   Resp 16   Ht 65.5\"   Wt 155 lb   BMI 25.40 kg/m²   Looks stated age  Pink conjunctiva anicteric scl entirety of the visit

## 2019-07-19 NOTE — PATIENT INSTRUCTIONS
[address] MD  GAIT-TRAINING EXERCISES  Begin walking with feet on a comfortable distance apart, progress to tandem position (1 jaswinder position as well, wait until it subsides and then sit up again. Perform this exercise 5 times in the morning and 5 times at night until 2 days have passed during which you do not experience vertigo. Repeat 8 counts to each side.

## 2019-07-29 ENCOUNTER — TELEPHONE (OUTPATIENT)
Dept: CARDIOLOGY | Age: 64
End: 2019-07-29

## 2019-07-29 RX ORDER — PANTOPRAZOLE SODIUM 40 MG/1
40 TABLET, DELAYED RELEASE ORAL 2 TIMES DAILY
Qty: 60 TABLET | Refills: 5 | Status: SHIPPED | OUTPATIENT
Start: 2019-07-29 | End: 2020-03-31 | Stop reason: SDUPTHER

## 2019-08-01 ENCOUNTER — OFFICE VISIT (OUTPATIENT)
Dept: OTOLARYNGOLOGY | Age: 64
End: 2019-08-01

## 2019-08-01 VITALS — BODY MASS INDEX: 23.54 KG/M2 | WEIGHT: 150 LBS | HEIGHT: 67 IN

## 2019-08-01 DIAGNOSIS — R42 VERTIGO: Primary | ICD-10-CM

## 2019-08-01 DIAGNOSIS — H91.90 HEARING LOSS, UNSPECIFIED HEARING LOSS TYPE, UNSPECIFIED LATERALITY: ICD-10-CM

## 2019-08-01 PROCEDURE — 99213 OFFICE O/P EST LOW 20 MIN: CPT | Performed by: PHYSICIAN ASSISTANT

## 2019-08-01 PROCEDURE — 92504 EAR MICROSCOPY EXAMINATION: CPT | Performed by: PHYSICIAN ASSISTANT

## 2019-08-21 ENCOUNTER — MED REC SCAN ONLY (OUTPATIENT)
Dept: NEUROLOGY | Facility: CLINIC | Age: 64
End: 2019-08-21

## 2019-08-31 ENCOUNTER — WALK IN (OUTPATIENT)
Dept: URGENT CARE | Age: 64
End: 2019-08-31

## 2019-08-31 DIAGNOSIS — R30.0 DYSURIA: ICD-10-CM

## 2019-08-31 DIAGNOSIS — N39.0 URINARY TRACT INFECTION WITHOUT HEMATURIA, SITE UNSPECIFIED: Primary | ICD-10-CM

## 2019-08-31 LAB
BILIRUBIN URINE: NEGATIVE
BLOOD URINE: ABNORMAL
CLARITY: ABNORMAL
COLOR: YELLOW
GLUCOSE QUALITATIVE U: NEGATIVE
KETONES, URINE: NEGATIVE
LEUKOCYTE ESTERASE URINE: ABNORMAL
MUCOUS: ABNORMAL
NITRITE URINE: NEGATIVE
PH URINE: 6 (ref 5–7)
RED BLOOD CELLS URINE: >100 (ref 0–3)
SPECIFIC GRAVITY URINE: 1.01 (ref 1–1.03)
SQUAMOUS EPITHELIAL CELLS: 0
URINE PROTEIN, QUAL (DIPSTICK): 100
UROBILINOGEN URINE: <2
WHITE BLOOD CELLS URINE: >100 (ref 0–5)

## 2019-08-31 PROCEDURE — 3077F SYST BP >= 140 MM HG: CPT | Performed by: FAMILY MEDICINE

## 2019-08-31 PROCEDURE — 87088 URINE BACTERIA CULTURE: CPT | Performed by: FAMILY MEDICINE

## 2019-08-31 PROCEDURE — 81003 URINALYSIS AUTO W/O SCOPE: CPT | Performed by: FAMILY MEDICINE

## 2019-08-31 PROCEDURE — 3080F DIAST BP >= 90 MM HG: CPT | Performed by: FAMILY MEDICINE

## 2019-08-31 PROCEDURE — 99214 OFFICE O/P EST MOD 30 MIN: CPT | Performed by: FAMILY MEDICINE

## 2019-08-31 PROCEDURE — 87086 URINE CULTURE/COLONY COUNT: CPT | Performed by: FAMILY MEDICINE

## 2019-08-31 PROCEDURE — 87186 SC STD MICRODIL/AGAR DIL: CPT | Performed by: FAMILY MEDICINE

## 2019-08-31 RX ORDER — OMEGA-3-ACID ETHYL ESTERS 1 G/1
1 CAPSULE, LIQUID FILLED ORAL DAILY
COMMUNITY

## 2019-08-31 RX ORDER — NITROFURANTOIN 25; 75 MG/1; MG/1
100 CAPSULE ORAL 2 TIMES DAILY
Qty: 10 CAPSULE | Refills: 0 | Status: SHIPPED | OUTPATIENT
Start: 2019-08-31 | End: 2019-09-05

## 2019-08-31 SDOH — HEALTH STABILITY: MENTAL HEALTH: HOW OFTEN DO YOU HAVE A DRINK CONTAINING ALCOHOL?: 2-3 TIMES A WEEK

## 2019-08-31 ASSESSMENT — PAIN SCALES - GENERAL: PAINLEVEL: 0

## 2019-09-03 LAB — FINAL REPORT: ABNORMAL

## 2019-10-01 PROCEDURE — 90471 IMMUNIZATION ADMIN: CPT

## 2019-10-01 PROCEDURE — 90686 IIV4 VACC NO PRSV 0.5 ML IM: CPT

## 2019-10-11 ENCOUNTER — IMMUNIZATION (OUTPATIENT)
Dept: PEDIATRICS | Age: 64
End: 2019-10-11

## 2019-10-11 DIAGNOSIS — Z23 NEED FOR INFLUENZA VACCINATION: Primary | ICD-10-CM

## 2019-10-25 RX ORDER — ONDANSETRON 4 MG/1
4 TABLET, FILM COATED ORAL EVERY 8 HOURS PRN
Qty: 20 TABLET | Refills: 0 | Status: CANCELLED | OUTPATIENT
Start: 2019-10-25

## 2019-10-25 RX ORDER — ONDANSETRON HYDROCHLORIDE 8 MG/1
8 TABLET, FILM COATED ORAL EVERY 8 HOURS PRN
Qty: 20 TABLET | Refills: 0 | Status: SHIPPED | OUTPATIENT
Start: 2019-10-25 | End: 2022-01-13 | Stop reason: ALTCHOICE

## 2019-11-21 ENCOUNTER — OFFICE VISIT (OUTPATIENT)
Dept: NEUROLOGY | Facility: CLINIC | Age: 64
End: 2019-11-21
Payer: COMMERCIAL

## 2019-11-21 VITALS
RESPIRATION RATE: 16 BRPM | HEART RATE: 80 BPM | WEIGHT: 165 LBS | SYSTOLIC BLOOD PRESSURE: 152 MMHG | DIASTOLIC BLOOD PRESSURE: 80 MMHG | BODY MASS INDEX: 27 KG/M2

## 2019-11-21 DIAGNOSIS — H81.391 PERIPHERAL VERTIGO INVOLVING RIGHT EAR: Primary | ICD-10-CM

## 2019-11-21 PROCEDURE — 99213 OFFICE O/P EST LOW 20 MIN: CPT | Performed by: PHYSICIAN ASSISTANT

## 2019-11-21 RX ORDER — PANTOPRAZOLE SODIUM 40 MG/1
40 TABLET, DELAYED RELEASE ORAL
COMMUNITY

## 2019-11-21 NOTE — PROGRESS NOTES
[facility] with Kervin  9/[birth year]  Primary Care Provider:  Emile Lake    11/21/2019  Accompanied visit: Yes-       71year old female patient being seen for: Vertigo (74.8 kg)   BMI 27.04 kg/m²   Looks stated age  Pink conjunctiva anicteric sclerae, moist mucosa  No LAD, neck supple  Lungs clear breath sounds  Heart S1S2, no abnormal sounds  Pink nailbeds no Cyanosis    NEURO  NAD, A&OX3  EOMI, fatigueable nystagmus  C

## 2019-12-10 PROBLEM — H81.399 PERIPHERAL VERTIGO: Status: ACTIVE | Noted: 2019-12-10

## 2020-02-13 DIAGNOSIS — I10 ESSENTIAL HYPERTENSION: ICD-10-CM

## 2020-02-13 RX ORDER — METOPROLOL SUCCINATE 50 MG/1
TABLET, EXTENDED RELEASE ORAL
Qty: 90 TABLET | Refills: 0 | Status: SHIPPED | OUTPATIENT
Start: 2020-02-13 | End: 2020-03-23 | Stop reason: SDUPTHER

## 2020-02-21 DIAGNOSIS — H81.391 PERIPHERAL VERTIGO INVOLVING RIGHT EAR: Primary | ICD-10-CM

## 2020-02-21 RX ORDER — ONDANSETRON 4 MG/1
4 TABLET, ORALLY DISINTEGRATING ORAL EVERY 8 HOURS PRN
Qty: 30 TABLET | Refills: 0 | Status: SHIPPED | OUTPATIENT
Start: 2020-02-21 | End: 2020-10-06

## 2020-02-21 NOTE — TELEPHONE ENCOUNTER
Medication: Zofran     Date of last refill: 02/27/2019  [place]     Last office visit: 11/21/2019  Due back to clinic per last office note:  6 weeks  Date next office visit scheduled:  No future appointments.     Last OV

## 2020-02-21 NOTE — TELEPHONE ENCOUNTER
Patient would like Zofran,  was in the office. Medication is pended for Dr CORTEZ to review and sign it.

## 2020-03-09 RX ORDER — ATORVASTATIN CALCIUM 40 MG/1
TABLET, FILM COATED ORAL
Qty: 90 TABLET | Refills: 3 | Status: SHIPPED | OUTPATIENT
Start: 2020-03-09 | End: 2021-03-05

## 2020-03-23 ENCOUNTER — OFFICE VISIT (OUTPATIENT)
Dept: CARDIOLOGY | Age: 65
End: 2020-03-23

## 2020-03-23 ENCOUNTER — APPOINTMENT (OUTPATIENT)
Dept: CARDIOLOGY | Age: 65
End: 2020-03-23

## 2020-03-23 DIAGNOSIS — I10 ESSENTIAL HYPERTENSION: ICD-10-CM

## 2020-03-23 DIAGNOSIS — I65.23 BILATERAL CAROTID ARTERY STENOSIS: ICD-10-CM

## 2020-03-23 DIAGNOSIS — E78.49 OTHER HYPERLIPIDEMIA: Primary | ICD-10-CM

## 2020-03-23 PROCEDURE — 99214 OFFICE O/P EST MOD 30 MIN: CPT | Performed by: INTERNAL MEDICINE

## 2020-03-23 RX ORDER — METOPROLOL SUCCINATE 50 MG/1
50 TABLET, EXTENDED RELEASE ORAL DAILY
Qty: 90 TABLET | Refills: 3 | Status: SHIPPED | OUTPATIENT
Start: 2020-03-23 | End: 2020-03-31 | Stop reason: SDUPTHER

## 2020-03-31 ENCOUNTER — E-ADVICE (OUTPATIENT)
Dept: CARDIOLOGY | Age: 65
End: 2020-03-31

## 2020-03-31 ENCOUNTER — TELEPHONE (OUTPATIENT)
Dept: GASTROENTEROLOGY | Age: 65
End: 2020-03-31

## 2020-03-31 DIAGNOSIS — K62.5 RECTAL BLEEDING: Primary | ICD-10-CM

## 2020-03-31 DIAGNOSIS — I10 ESSENTIAL HYPERTENSION: ICD-10-CM

## 2020-03-31 RX ORDER — PANTOPRAZOLE SODIUM 40 MG/1
40 TABLET, DELAYED RELEASE ORAL 2 TIMES DAILY
Qty: 60 TABLET | Refills: 2 | Status: SHIPPED | OUTPATIENT
Start: 2020-03-31 | End: 2022-01-13 | Stop reason: ALTCHOICE

## 2020-03-31 RX ORDER — METOPROLOL SUCCINATE 50 MG/1
50 TABLET, EXTENDED RELEASE ORAL DAILY
Qty: 90 TABLET | Refills: 3 | Status: SHIPPED | OUTPATIENT
Start: 2020-03-31 | End: 2021-09-09 | Stop reason: SDUPTHER

## 2020-08-26 ENCOUNTER — TELEPHONE (OUTPATIENT)
Dept: URGENT CARE | Age: 65
End: 2020-08-26

## 2020-08-26 ENCOUNTER — WALK IN (OUTPATIENT)
Dept: URGENT CARE | Age: 65
End: 2020-08-26

## 2020-08-26 ENCOUNTER — E-ADVICE (OUTPATIENT)
Dept: FAMILY MEDICINE | Age: 65
End: 2020-08-26

## 2020-08-26 VITALS
RESPIRATION RATE: 16 BRPM | TEMPERATURE: 98.2 F | OXYGEN SATURATION: 98 % | HEART RATE: 104 BPM | DIASTOLIC BLOOD PRESSURE: 110 MMHG | SYSTOLIC BLOOD PRESSURE: 192 MMHG

## 2020-08-26 DIAGNOSIS — I10 HYPERTENSION, UNSPECIFIED TYPE: Primary | ICD-10-CM

## 2020-08-26 LAB
*MEAN CORPUSCULAR HGB CONC: 34.4 G/DL (ref 32.5–35.8)
A/G RATIO: 1.39 (ref 1.1–2.4)
ALANINE AMINOTRANSFE: 31 U/L (ref 7–52)
ALBUMIN, SERUM (ALB): 4.6 G/DL (ref 3.5–5.7)
ALKALINE PHOSPHATASE (ALK): 86 U/L (ref 34–104)
ANION GAP: 10 MEQ/L (ref 6.2–14.7)
ASPARTATE AMINOTRANS: 76 U/L (ref 13–39)
BASOPHIL AUTOMATED: 0.7 %
BASOPHILS: 0 (ref 0–0.2)
BILIRUBIN, TOTAL: 1.1 MG/DL (ref 0.2–0.8)
BLOOD UREA NITROGEN (BUN): 14 MG/DL (ref 7–25)
BUN/CREATININE RATIO: 19.4 (ref 7.4–23)
CALCIUM, SERUM: 9.6 MG/DL (ref 8.6–10.3)
CARBON DIOXIDE: 28 MMOL/L (ref 21–31)
CHLORIDE, SERUM: 99 MMOL/L (ref 98–107)
CREATININE: 0.72 MG/DL (ref 0.6–1.2)
EOSINOPHIL AUTOMATED: 0.3 %
EOSINOPHILS: 0 (ref 0–0.5)
EST GLOMERULAR FILTRATION RATE: > 60 1.73M SQ
GLUCOSE: 110 MG/DL (ref 70–99)
HEMATOCRIT: 47.5 % (ref 34.7–45.1)
HEMOGLOBIN: 16.3 GM/DL (ref 12–15.3)
K (POTASSIUM, SERUM): 3.5 MMOL/L (ref 3.5–5.1)
LYMPHOCYTE AUTOMATED: 21.1 %
LYMPHOCYTES: 1.2 (ref 0.6–3.4)
MEAN CORPUSCULAR HGB: 34.3 PG (ref 26–34)
MEAN CORPUSCULAR VOL: 99.8 FL (ref 80–100)
MEAN PLATELET VOLUME: 8 FL (ref 6.8–10.2)
MONOCYTE AUTOMATED: 8.4 %
MONOCYTES: 0.5 (ref 0.3–1)
NA (SODIUM, SERUM): 137 MMOL/L (ref 133–144)
NEUTROPHIL ABSOLUTE: 3.9 (ref 1.7–7.7)
NEUTROPHIL AUTOMATED: 69.5 %
PLATELET COUNT: 183 K/MM3 (ref 150–450)
PROTEIN TOTAL: 7.9 G/DL (ref 6.4–8.9)
RED BLOOD CELL COUNT: 4.76 M/MM3 (ref 3.63–5.04)
RED CELL DISTRIBUTIO: 13.7 % (ref 11.9–15.9)
WHITE BLOOD CELL COU: 5.6 K/MM3 (ref 4–11)

## 2020-08-26 PROCEDURE — 93010 ELECTROCARDIOGRAM REPORT: CPT | Performed by: INTERNAL MEDICINE

## 2020-08-26 PROCEDURE — 99214 OFFICE O/P EST MOD 30 MIN: CPT | Performed by: FAMILY MEDICINE

## 2020-08-26 PROCEDURE — 3080F DIAST BP >= 90 MM HG: CPT | Performed by: FAMILY MEDICINE

## 2020-08-26 PROCEDURE — 3077F SYST BP >= 140 MM HG: CPT | Performed by: FAMILY MEDICINE

## 2020-08-27 LAB
COVID-19 RESULT: NOT DETECTED
COVID-19 SOURCE: NORMAL

## 2020-09-21 ENCOUNTER — APPOINTMENT (OUTPATIENT)
Dept: CARDIOLOGY | Age: 65
End: 2020-09-21

## 2020-10-06 DIAGNOSIS — H81.391 PERIPHERAL VERTIGO INVOLVING RIGHT EAR: ICD-10-CM

## 2020-10-06 RX ORDER — ONDANSETRON 4 MG/1
4 TABLET, ORALLY DISINTEGRATING ORAL EVERY 8 HOURS PRN
Qty: 30 TABLET | Refills: 0 | Status: SHIPPED | OUTPATIENT
Start: 2020-10-06 | End: 2021-09-24

## 2020-10-06 NOTE — TELEPHONE ENCOUNTER
Medication: Zofran 4 mg     Date of last refill: 02/21/20  [place]      Last office visit: 11/21/2019  Due back to clinic per last office note:  6 weeks  Date next office visit scheduled:  No future appointments.     Las

## 2020-12-04 NOTE — TELEPHONE ENCOUNTER
Medication: Meclizine 25 mg     Date of last refill: 07/19/2019 with 3 addt refills  [place]      Last office visit: 11/21/2019  Due back to clinic per last office note:  6 weeks  Date next office visit scheduled:  No fu

## 2020-12-04 NOTE — TELEPHONE ENCOUNTER
Spoke to patient to schedule a follow up appt.  Patient said she get back to us cause she is in the middle of moving and she will stay dizzy and hung up the phone

## 2020-12-07 RX ORDER — MECLIZINE HYDROCHLORIDE 25 MG/1
25 TABLET ORAL 3 TIMES DAILY PRN
Qty: 30 TABLET | Refills: 0 | Status: SHIPPED | OUTPATIENT
Start: 2020-12-07 | End: 2021-09-24

## 2021-01-22 ENCOUNTER — E-ADVICE (OUTPATIENT)
Dept: FAMILY MEDICINE | Age: 66
End: 2021-01-22

## 2021-02-06 ENCOUNTER — IMMUNIZATION (OUTPATIENT)
Dept: LAB | Age: 66
End: 2021-02-06

## 2021-02-06 DIAGNOSIS — Z23 NEED FOR VACCINATION: Primary | ICD-10-CM

## 2021-02-06 PROCEDURE — 0001A COVID 19 PFIZER-BIONTECH: CPT

## 2021-02-06 PROCEDURE — 91300 COVID 19 PFIZER-BIONTECH: CPT

## 2021-02-27 ENCOUNTER — IMMUNIZATION (OUTPATIENT)
Dept: LAB | Age: 66
End: 2021-02-27

## 2021-02-27 DIAGNOSIS — Z23 NEED FOR VACCINATION: Primary | ICD-10-CM

## 2021-02-27 PROCEDURE — 0002A COVID 19 PFIZER-BIONTECH: CPT

## 2021-02-27 PROCEDURE — 91300 COVID 19 PFIZER-BIONTECH: CPT

## 2021-03-05 RX ORDER — ATORVASTATIN CALCIUM 40 MG/1
TABLET, FILM COATED ORAL
Qty: 90 TABLET | Refills: 3 | Status: SHIPPED | OUTPATIENT
Start: 2021-03-05 | End: 2022-05-18

## 2021-05-25 VITALS
RESPIRATION RATE: 16 BRPM | OXYGEN SATURATION: 100 % | TEMPERATURE: 99 F | DIASTOLIC BLOOD PRESSURE: 102 MMHG | SYSTOLIC BLOOD PRESSURE: 180 MMHG | HEART RATE: 80 BPM

## 2021-06-15 ENCOUNTER — WALK IN (OUTPATIENT)
Dept: URGENT CARE | Age: 66
End: 2021-06-15

## 2021-06-15 VITALS
OXYGEN SATURATION: 100 % | SYSTOLIC BLOOD PRESSURE: 160 MMHG | DIASTOLIC BLOOD PRESSURE: 98 MMHG | HEART RATE: 108 BPM | TEMPERATURE: 97.1 F | RESPIRATION RATE: 16 BRPM

## 2021-06-15 DIAGNOSIS — R31.9 URINARY TRACT INFECTION WITH HEMATURIA, SITE UNSPECIFIED: ICD-10-CM

## 2021-06-15 DIAGNOSIS — R30.0 DYSURIA: Primary | ICD-10-CM

## 2021-06-15 DIAGNOSIS — N39.0 URINARY TRACT INFECTION WITH HEMATURIA, SITE UNSPECIFIED: ICD-10-CM

## 2021-06-15 LAB
BACTERIA: ABNORMAL
BILIRUBIN URINE: NEGATIVE
BLOOD URINE: ABNORMAL
CLARITY: ABNORMAL
COLOR: ABNORMAL
GLUCOSE QUALITATIVE U: NEGATIVE
KETONES, URINE: NEGATIVE
LEUKOCYTE ESTERASE URINE: ABNORMAL
MUCOUS: ABNORMAL
NITRITE URINE: NEGATIVE
PH URINE: 7 (ref 5–7)
RED BLOOD CELLS URINE: ABNORMAL (ref 0–3)
SPECIFIC GRAVITY URINE: 1 (ref 1–1.03)
SQUAMOUS EPITHELIAL CELLS: ABNORMAL
URINE PROTEIN, QUAL (DIPSTICK): 100
UROBILINOGEN URINE: <2
WHITE BLOOD CELLS URINE: >100 (ref 0–5)

## 2021-06-15 PROCEDURE — 3077F SYST BP >= 140 MM HG: CPT | Performed by: FAMILY MEDICINE

## 2021-06-15 PROCEDURE — 3080F DIAST BP >= 90 MM HG: CPT | Performed by: FAMILY MEDICINE

## 2021-06-15 PROCEDURE — 81003 URINALYSIS AUTO W/O SCOPE: CPT | Performed by: FAMILY MEDICINE

## 2021-06-15 PROCEDURE — 99214 OFFICE O/P EST MOD 30 MIN: CPT | Performed by: FAMILY MEDICINE

## 2021-06-15 PROCEDURE — 87086 URINE CULTURE/COLONY COUNT: CPT | Performed by: FAMILY MEDICINE

## 2021-06-15 RX ORDER — NITROFURANTOIN 25; 75 MG/1; MG/1
100 CAPSULE ORAL 2 TIMES DAILY
Qty: 10 CAPSULE | Refills: 0 | Status: SHIPPED | OUTPATIENT
Start: 2021-06-15 | End: 2021-06-20

## 2021-06-15 ASSESSMENT — PAIN SCALES - GENERAL: PAINLEVEL: 0

## 2021-06-16 LAB — FINAL REPORT: NORMAL

## 2021-07-18 ENCOUNTER — WALK IN (OUTPATIENT)
Dept: URGENT CARE | Age: 66
End: 2021-07-18

## 2021-07-18 VITALS
OXYGEN SATURATION: 100 % | HEART RATE: 80 BPM | TEMPERATURE: 97.3 F | RESPIRATION RATE: 16 BRPM | DIASTOLIC BLOOD PRESSURE: 98 MMHG | SYSTOLIC BLOOD PRESSURE: 164 MMHG

## 2021-07-18 DIAGNOSIS — R30.0 DYSURIA: Primary | ICD-10-CM

## 2021-07-18 DIAGNOSIS — N39.0 ACUTE UTI: ICD-10-CM

## 2021-07-18 LAB
BILIRUBIN URINE: NEGATIVE
BLOOD URINE: ABNORMAL
CLARITY: ABNORMAL
COLOR: YELLOW
GLUCOSE QUALITATIVE U: NEGATIVE
KETONES, URINE: NEGATIVE
LEUKOCYTE ESTERASE URINE: ABNORMAL
MUCOUS: ABNORMAL
NITRITE URINE: NEGATIVE
PH URINE: 6 (ref 5–7)
RED BLOOD CELLS URINE: ABNORMAL (ref 0–3)
SPECIFIC GRAVITY URINE: 1.01 (ref 1–1.03)
SQUAMOUS EPITHELIAL CELLS: ABNORMAL
URINE PROTEIN, QUAL (DIPSTICK): 30
UROBILINOGEN URINE: <2
WHITE BLOOD CELLS URINE: >100 (ref 0–5)

## 2021-07-18 PROCEDURE — 99214 OFFICE O/P EST MOD 30 MIN: CPT | Performed by: FAMILY MEDICINE

## 2021-07-18 PROCEDURE — 81003 URINALYSIS AUTO W/O SCOPE: CPT | Performed by: FAMILY MEDICINE

## 2021-07-18 PROCEDURE — 3080F DIAST BP >= 90 MM HG: CPT | Performed by: FAMILY MEDICINE

## 2021-07-18 PROCEDURE — 3077F SYST BP >= 140 MM HG: CPT | Performed by: FAMILY MEDICINE

## 2021-07-18 PROCEDURE — 87086 URINE CULTURE/COLONY COUNT: CPT | Performed by: FAMILY MEDICINE

## 2021-07-18 RX ORDER — CEFUROXIME AXETIL 500 MG/1
500 TABLET ORAL 2 TIMES DAILY
Qty: 10 TABLET | Refills: 0 | Status: SHIPPED | OUTPATIENT
Start: 2021-07-18 | End: 2021-07-23

## 2021-07-18 ASSESSMENT — PAIN SCALES - GENERAL: PAINLEVEL: 0

## 2021-07-19 LAB — FINAL REPORT: NORMAL

## 2021-08-02 DIAGNOSIS — I10 ESSENTIAL HYPERTENSION: ICD-10-CM

## 2021-08-27 RX ORDER — METOPROLOL SUCCINATE 50 MG/1
TABLET, EXTENDED RELEASE ORAL
Qty: 90 TABLET | Refills: 0 | OUTPATIENT
Start: 2021-08-27

## 2021-08-28 DIAGNOSIS — I10 ESSENTIAL HYPERTENSION: ICD-10-CM

## 2021-08-30 ENCOUNTER — TELEPHONE (OUTPATIENT)
Dept: NEUROLOGY | Facility: CLINIC | Age: 66
End: 2021-08-30

## 2021-08-30 DIAGNOSIS — H81.391 PERIPHERAL VERTIGO INVOLVING RIGHT EAR: ICD-10-CM

## 2021-08-30 RX ORDER — ONDANSETRON 4 MG/1
4 TABLET, ORALLY DISINTEGRATING ORAL EVERY 8 HOURS PRN
Qty: 30 TABLET | Refills: 0 | Status: CANCELLED | OUTPATIENT
Start: 2021-08-30

## 2021-08-30 RX ORDER — MECLIZINE HYDROCHLORIDE 25 MG/1
25 TABLET ORAL 3 TIMES DAILY PRN
Qty: 30 TABLET | Refills: 0 | Status: CANCELLED | OUTPATIENT
Start: 2021-08-30

## 2021-08-30 NOTE — TELEPHONE ENCOUNTER
Patient has increased vertigo, is seeking refills on Zofran and Meclizine. Has not been seen since 11/2019, does not want to make appt today as is ill. Will call to schedule tomorrow. Last Epley maneuver done in 7/2019 with success.       Medication: onda

## 2021-08-30 NOTE — TELEPHONE ENCOUNTER
Patient's  is calling, she is having an increase in vertigo symptoms and medications are not helping. States she needs a refill on  25mg meclazine - has a few pills left. Also prescribed Ondansetron 4mg for nausea -- she has 2 pills left.  Not

## 2021-09-01 RX ORDER — METOPROLOL SUCCINATE 50 MG/1
TABLET, EXTENDED RELEASE ORAL
Qty: 90 TABLET | Refills: 0 | OUTPATIENT
Start: 2021-09-01

## 2021-09-01 NOTE — TELEPHONE ENCOUNTER
She requires an office visit as she has not been seen in almost 2 years to get refills of her medications.

## 2021-09-01 NOTE — TELEPHONE ENCOUNTER
Patient has made appointment for 9/23, is aware that she must be seen prior to any medication refills.

## 2021-09-07 DIAGNOSIS — I10 ESSENTIAL HYPERTENSION: ICD-10-CM

## 2021-09-09 DIAGNOSIS — I10 ESSENTIAL HYPERTENSION: ICD-10-CM

## 2021-09-09 RX ORDER — METOPROLOL SUCCINATE 50 MG/1
TABLET, EXTENDED RELEASE ORAL
Qty: 90 TABLET | Refills: 0 | OUTPATIENT
Start: 2021-09-09

## 2021-09-09 RX ORDER — METOPROLOL SUCCINATE 50 MG/1
50 TABLET, EXTENDED RELEASE ORAL DAILY
Qty: 90 TABLET | Refills: 3 | Status: SHIPPED | OUTPATIENT
Start: 2021-09-09 | End: 2021-11-30 | Stop reason: ALTCHOICE

## 2021-09-23 ENCOUNTER — OFFICE VISIT (OUTPATIENT)
Dept: NEUROLOGY | Facility: CLINIC | Age: 66
End: 2021-09-23
Payer: COMMERCIAL

## 2021-09-23 VITALS
HEART RATE: 79 BPM | WEIGHT: 168 LBS | HEIGHT: 67 IN | BODY MASS INDEX: 26.37 KG/M2 | SYSTOLIC BLOOD PRESSURE: 150 MMHG | DIASTOLIC BLOOD PRESSURE: 96 MMHG | RESPIRATION RATE: 16 BRPM

## 2021-09-23 DIAGNOSIS — F41.9 ANXIETY: ICD-10-CM

## 2021-09-23 DIAGNOSIS — H81.10 BENIGN PAROXYSMAL POSITIONAL VERTIGO, UNSPECIFIED LATERALITY: Primary | ICD-10-CM

## 2021-09-23 PROCEDURE — 3080F DIAST BP >= 90 MM HG: CPT | Performed by: PHYSICIAN ASSISTANT

## 2021-09-23 PROCEDURE — 3077F SYST BP >= 140 MM HG: CPT | Performed by: PHYSICIAN ASSISTANT

## 2021-09-23 PROCEDURE — 99214 OFFICE O/P EST MOD 30 MIN: CPT | Performed by: PHYSICIAN ASSISTANT

## 2021-09-23 PROCEDURE — 3008F BODY MASS INDEX DOCD: CPT | Performed by: PHYSICIAN ASSISTANT

## 2021-09-23 RX ORDER — ESCITALOPRAM OXALATE 10 MG/1
TABLET ORAL
Qty: 30 TABLET | Refills: 3 | Status: SHIPPED | OUTPATIENT
Start: 2021-09-23 | End: 2022-01-07

## 2021-09-23 NOTE — PROGRESS NOTES
[facility]  Primary Care Provider:  Chuck Malik    9/23/2021  Accompanied visit: Yes-     Previous visit and existing record notes reviewed in Current Outpatient Medications:   •  meclizine 25 MG Oral Tab, Take 1 tablet (25 mg total) by mouth 3 (three) times daily as needed. , Disp: 30 tablet, Rfl: 0  •  ondansetron 4 MG Oral Tablet Dispersible, Take 1 tablet (4 mg total) by mouth every 8 (eig the vestibular exercises daily  Anxiety- Start Lexapro 10mg daily      (X) Discussed potential side effects of any treatment relevant to above. Includes explanation of tests as necessary. Return in about 3 months (around 12/23/2021).       Patient under

## 2021-09-24 ENCOUNTER — TELEPHONE (OUTPATIENT)
Dept: NEUROLOGY | Facility: CLINIC | Age: 66
End: 2021-09-24

## 2021-09-24 RX ORDER — MECLIZINE HYDROCHLORIDE 25 MG/1
25 TABLET ORAL 3 TIMES DAILY PRN
Qty: 30 TABLET | Refills: 0 | Status: SHIPPED | OUTPATIENT
Start: 2021-09-24

## 2021-09-24 RX ORDER — ONDANSETRON 4 MG/1
4 TABLET, ORALLY DISINTEGRATING ORAL EVERY 8 HOURS PRN
Qty: 30 TABLET | Refills: 0 | Status: SHIPPED | OUTPATIENT
Start: 2021-09-24

## 2021-09-24 NOTE — TELEPHONE ENCOUNTER
RN has the signed form of Statement of [address] Needs clarification what we are suppose to do with the letter.

## 2021-11-27 ENCOUNTER — WALK IN (OUTPATIENT)
Dept: URGENT CARE | Age: 66
End: 2021-11-27

## 2021-11-27 VITALS
RESPIRATION RATE: 16 BRPM | HEART RATE: 80 BPM | OXYGEN SATURATION: 99 % | TEMPERATURE: 97.3 F | DIASTOLIC BLOOD PRESSURE: 90 MMHG | SYSTOLIC BLOOD PRESSURE: 186 MMHG

## 2021-11-27 DIAGNOSIS — N39.0 URINARY TRACT INFECTION WITH HEMATURIA, SITE UNSPECIFIED: Primary | ICD-10-CM

## 2021-11-27 DIAGNOSIS — R39.9 UTI SYMPTOMS: ICD-10-CM

## 2021-11-27 DIAGNOSIS — R31.9 URINARY TRACT INFECTION WITH HEMATURIA, SITE UNSPECIFIED: Primary | ICD-10-CM

## 2021-11-27 LAB
BILIRUBIN URINE: NEGATIVE
BLOOD URINE: ABNORMAL
CLARITY: ABNORMAL
COLOR: YELLOW
GLUCOSE QUALITATIVE U: NEGATIVE
HYALINE CAST: ABNORMAL
KETONES, URINE: NEGATIVE
LEUKOCYTE ESTERASE URINE: ABNORMAL
MUCOUS: ABNORMAL
NITRITE URINE: NEGATIVE
PH URINE: 6 (ref 5–7)
RED BLOOD CELLS URINE: >100 (ref 0–3)
SPECIFIC GRAVITY URINE: 1.01 (ref 1–1.03)
SQUAMOUS EPITHELIAL CELLS: ABNORMAL
URINE PROTEIN, QUAL (DIPSTICK): 100
UROBILINOGEN URINE: <2
WBC CLUMPS: ABNORMAL
WHITE BLOOD CELLS URINE: >100 (ref 0–5)

## 2021-11-27 PROCEDURE — 81003 URINALYSIS AUTO W/O SCOPE: CPT | Performed by: FAMILY MEDICINE

## 2021-11-27 PROCEDURE — 99214 OFFICE O/P EST MOD 30 MIN: CPT | Performed by: FAMILY MEDICINE

## 2021-11-27 PROCEDURE — 87088 URINE BACTERIA CULTURE: CPT | Performed by: FAMILY MEDICINE

## 2021-11-27 PROCEDURE — 87186 SC STD MICRODIL/AGAR DIL: CPT | Performed by: FAMILY MEDICINE

## 2021-11-27 PROCEDURE — 3077F SYST BP >= 140 MM HG: CPT | Performed by: FAMILY MEDICINE

## 2021-11-27 PROCEDURE — 87086 URINE CULTURE/COLONY COUNT: CPT | Performed by: FAMILY MEDICINE

## 2021-11-27 RX ORDER — CIPROFLOXACIN 500 MG/1
500 TABLET, FILM COATED ORAL 2 TIMES DAILY
Qty: 14 TABLET | Refills: 0 | Status: SHIPPED | OUTPATIENT
Start: 2021-11-27 | End: 2021-12-04

## 2021-11-27 RX ORDER — ESCITALOPRAM OXALATE 10 MG/1
TABLET ORAL
COMMUNITY
Start: 2021-11-14 | End: 2021-11-30 | Stop reason: SDUPTHER

## 2021-11-30 ENCOUNTER — OFFICE VISIT (OUTPATIENT)
Dept: CARDIOLOGY | Age: 66
End: 2021-11-30

## 2021-11-30 VITALS
HEART RATE: 70 BPM | BODY MASS INDEX: 24.01 KG/M2 | RESPIRATION RATE: 18 BRPM | SYSTOLIC BLOOD PRESSURE: 142 MMHG | OXYGEN SATURATION: 100 % | WEIGHT: 153 LBS | HEIGHT: 67 IN | DIASTOLIC BLOOD PRESSURE: 90 MMHG

## 2021-11-30 DIAGNOSIS — I10 PRIMARY HYPERTENSION: Primary | ICD-10-CM

## 2021-11-30 LAB — FINAL REPORT: ABNORMAL

## 2021-11-30 PROCEDURE — 99214 OFFICE O/P EST MOD 30 MIN: CPT | Performed by: INTERNAL MEDICINE

## 2021-11-30 PROCEDURE — 3077F SYST BP >= 140 MM HG: CPT | Performed by: INTERNAL MEDICINE

## 2021-11-30 RX ORDER — ESCITALOPRAM OXALATE 10 MG/1
1 TABLET ORAL DAILY
COMMUNITY
Start: 2021-09-23 | End: 2022-11-14 | Stop reason: SDUPTHER

## 2021-11-30 RX ORDER — ONDANSETRON 4 MG/1
1 TABLET, ORALLY DISINTEGRATING ORAL PRN
COMMUNITY
Start: 2021-09-24 | End: 2021-11-30 | Stop reason: SDUPTHER

## 2021-11-30 RX ORDER — CARVEDILOL 12.5 MG/1
12.5 TABLET ORAL 2 TIMES DAILY WITH MEALS
Qty: 60 TABLET | Refills: 5 | Status: SHIPPED | OUTPATIENT
Start: 2021-11-30 | End: 2022-01-13 | Stop reason: ALTCHOICE

## 2021-11-30 ASSESSMENT — ENCOUNTER SYMPTOMS
FEVER: 0
AGITATION: 0
UNEXPECTED WEIGHT CHANGE: 0
ANAL BLEEDING: 0
FATIGUE: 0
SHORTNESS OF BREATH: 0
DIAPHORESIS: 0
ABDOMINAL PAIN: 0
DIZZINESS: 0
BLOOD IN STOOL: 0
WEAKNESS: 0
HEADACHES: 0

## 2021-12-14 ENCOUNTER — NURSE ONLY (OUTPATIENT)
Dept: CARDIOLOGY | Age: 66
End: 2021-12-14

## 2021-12-14 VITALS
HEART RATE: 80 BPM | RESPIRATION RATE: 18 BRPM | SYSTOLIC BLOOD PRESSURE: 142 MMHG | OXYGEN SATURATION: 97 % | DIASTOLIC BLOOD PRESSURE: 90 MMHG

## 2021-12-14 DIAGNOSIS — I10 PRIMARY HYPERTENSION: Primary | ICD-10-CM

## 2021-12-14 PROCEDURE — X1094 NO CHARGE VISIT: HCPCS | Performed by: INTERNAL MEDICINE

## 2021-12-18 LAB
*MEAN CORPUSCULAR HGB CONC: 33.2 G/DL (ref 32.5–35.8)
A/G RATIO: 1.24 (ref 1.1–2.4)
ALANINE AMINOTRANSFE: 20 U/L (ref 7–52)
ALBUMIN, SERUM (ALB): 4.7 G/DL (ref 3.5–5.7)
ALKALINE PHOSPHATASE (ALK): 98 U/L (ref 34–104)
ANION GAP: 15 MEQ/L (ref 6.2–14.7)
ASPARTATE AMINOTRANS: 40 U/L (ref 13–39)
BILIRUBIN, TOTAL: 1.1 MG/DL (ref 0.2–0.8)
BLOOD UREA NITROGEN (BUN): 22 MG/DL (ref 7–25)
BUN/CREATININE RATIO: 27.2 (ref 7.4–23)
CALCIUM, SERUM: 10.5 MG/DL (ref 8.6–10.3)
CARBON DIOXIDE: 24 MEQ/L (ref 21–31)
CHLORIDE, SERUM: 97 MMOL/L (ref 98–107)
COVID-19 RAPID RESULT: NEGATIVE
COVID-19 RAPID SOURCE: NORMAL
CREATININE: 0.81 MG/DL (ref 0.6–1.2)
EST GLOMERULAR FILTRATION RATE: > 60 ML/MIN
GLUCOSE: 143 MG/DL (ref 70–99)
HEMATOCRIT: 49.5 % (ref 34.7–45.1)
HEMOGLOBIN: 16.4 GM/DL (ref 12–15.3)
INFECTIOUS MONONUCLE: 4 % (ref 3–13)
K (POTASSIUM, SERUM): 4 MMOL/L (ref 3.5–5.2)
LYMPHOCYTE MANUAL: 13 % (ref 10–47)
MEAN CORPUSCULAR HGB: 31.8 PG (ref 26–34)
MEAN CORPUSCULAR VOL: 95.7 FL (ref 80–100)
MEAN PLATELET VOLUME: 8.4 FL (ref 6.8–10.2)
NA (SODIUM, SERUM): 136 MMOL/L (ref 133–144)
NEUTROPHILS # CALCULATED: 6.8 K/MM3
NEUTROPHILS: 83 % (ref 33–82)
PLATELET COMMENT: ABNORMAL
PLATELET COUNT: 208 K/MM3 (ref 150–450)
PLATELET MORPHOLOGY: ABNORMAL
PROTEIN TOTAL: 8.5 G/DL (ref 6.4–8.9)
RBC MORPHOLOGY: NORMAL
RED BLOOD CELL COUNT: 5.17 M/MM3 (ref 3.63–5.04)
RED CELL DISTRIBUTIO: 13.2 % (ref 11.9–15.9)
TOXIC GRANULATION: ABNORMAL
TROPONIN I HIGH SENSITIVITY: 13 PG/ML (ref 0–12)
TROPONIN I HIGH SENSITIVITY: 30 PG/ML (ref 0–12)
TROPONIN I HIGH SENSITIVITY: 42 PG/ML (ref 0–12)
WHITE BLOOD CELL COU: 8.2 K/MM3 (ref 4–11)

## 2021-12-18 PROCEDURE — 93010 ELECTROCARDIOGRAM REPORT: CPT | Performed by: INTERNAL MEDICINE

## 2021-12-19 ENCOUNTER — EXTERNAL RECORD (OUTPATIENT)
Dept: CARDIOLOGY | Age: 66
End: 2021-12-19

## 2021-12-19 ENCOUNTER — EXTERNAL RECORD (OUTPATIENT)
Dept: OTHER | Age: 66
End: 2021-12-19

## 2021-12-19 LAB
*MEAN CORPUSCULAR HGB CONC: 34.2 G/DL (ref 32.5–35.8)
ANION GAP: 10 MEQ/L (ref 6.2–14.7)
BASOPHIL AUTOMATED: 0.7 %
BASOPHILS: 0.1 (ref 0–0.14)
BLOOD UREA NITROGEN (BUN): 20 MG/DL (ref 7–25)
BUN/CREATININE RATIO: 30.8 (ref 7.4–23)
CALCIUM, SERUM: 8.5 MG/DL (ref 8.6–10.3)
CARBON DIOXIDE: 24 MEQ/L (ref 21–31)
CHLORIDE, SERUM: 104 MMOL/L (ref 98–107)
CREATININE: 0.65 MG/DL (ref 0.6–1.2)
EOSINOPHIL AUTOMATED: 0.4 %
EOSINOPHILS: 0 (ref 0–0.6)
EST GLOMERULAR FILTRATION RATE: > 60 ML/MIN
GLUCOSE: 87 MG/DL (ref 70–99)
HEMATOCRIT: 41.3 % (ref 34.7–45.1)
HEMOGLOBIN: 14.1 GM/DL (ref 12–15.3)
K (POTASSIUM, SERUM): 3.5 MMOL/L (ref 3.5–5.2)
LYMPHOCYTE AUTOMATED: 24.1 %
LYMPHOCYTES: 1.9 (ref 0.78–3.73)
MEAN CORPUSCULAR HGB: 32.5 PG (ref 26–34)
MEAN CORPUSCULAR VOL: 95 FL (ref 80–100)
MEAN PLATELET VOLUME: 7.6 FL (ref 6.8–10.2)
MONOCYTE AUTOMATED: 9.1 %
MONOCYTES: 0.7 (ref 0.17–1)
NA (SODIUM, SERUM): 138 MMOL/L (ref 133–144)
NEUTROPHIL ABSOLUTE: 5.2 (ref 1.91–7.6)
NEUTROPHIL AUTOMATED: 65.7 %
PLATELET COUNT: 229 K/MM3 (ref 150–450)
RED BLOOD CELL COUNT: 4.34 M/MM3 (ref 3.63–5.04)
RED CELL DISTRIBUTIO: 13.3 % (ref 11.9–15.9)
TROPONIN I HIGH SENSITIVITY: 74 PG/ML (ref 0–12)
WHITE BLOOD CELL COU: 7.9 K/MM3 (ref 4–11)

## 2021-12-19 PROCEDURE — 99223 1ST HOSP IP/OBS HIGH 75: CPT | Performed by: INTERNAL MEDICINE

## 2021-12-19 PROCEDURE — 99223 1ST HOSP IP/OBS HIGH 75: CPT | Performed by: HOSPITALIST

## 2021-12-20 PROCEDURE — 99233 SBSQ HOSP IP/OBS HIGH 50: CPT | Performed by: INTERNAL MEDICINE

## 2021-12-20 PROCEDURE — 93306 TTE W/DOPPLER COMPLETE: CPT | Performed by: INTERNAL MEDICINE

## 2021-12-20 PROCEDURE — 99232 SBSQ HOSP IP/OBS MODERATE 35: CPT | Performed by: HOSPITALIST

## 2021-12-21 ENCOUNTER — TELEPHONE (OUTPATIENT)
Dept: CARDIOLOGY | Age: 66
End: 2021-12-21

## 2021-12-21 ENCOUNTER — TELEPHONE (OUTPATIENT)
Dept: NEUROLOGY | Facility: CLINIC | Age: 66
End: 2021-12-21

## 2021-12-21 ENCOUNTER — TELEPHONE (OUTPATIENT)
Dept: FAMILY MEDICINE | Age: 66
End: 2021-12-21

## 2021-12-21 ENCOUNTER — EXTERNAL RECORD (OUTPATIENT)
Dept: OTHER | Age: 66
End: 2021-12-21

## 2021-12-21 DIAGNOSIS — H81.90 PERIPHERAL VESTIBULOPATHY, UNSPECIFIED LATERALITY: Primary | ICD-10-CM

## 2021-12-21 DIAGNOSIS — I10 PRIMARY HYPERTENSION: Primary | ICD-10-CM

## 2021-12-21 LAB
*MEAN CORPUSCULAR HGB CONC: 34.5 G/DL (ref 32.5–35.8)
ANION GAP: 9 MEQ/L (ref 6.2–14.7)
BLOOD UREA NITROGEN (BUN): 15 MG/DL (ref 7–25)
BUN/CREATININE RATIO: 31.3 (ref 7.4–23)
CALCIUM, SERUM: 8.3 MG/DL (ref 8.6–10.3)
CARBON DIOXIDE: 23 MEQ/L (ref 21–31)
CHLORIDE, SERUM: 106 MMOL/L (ref 98–107)
CREATININE: 0.48 MG/DL (ref 0.6–1.2)
EST GLOMERULAR FILTRATION RATE: > 60 ML/MIN
GLUCOSE: 90 MG/DL (ref 70–99)
HEMATOCRIT: 38.1 % (ref 34.7–45.1)
HEMOGLOBIN: 13.1 GM/DL (ref 12–15.3)
K (POTASSIUM, SERUM): 3 MMOL/L (ref 3.5–5.2)
K (POTASSIUM, SERUM): 3.1 MMOL/L (ref 3.5–5.2)
MEAN CORPUSCULAR HGB: 32.5 PG (ref 26–34)
MEAN CORPUSCULAR VOL: 94.2 FL (ref 80–100)
MEAN PLATELET VOLUME: 7.9 FL (ref 6.8–10.2)
NA (SODIUM, SERUM): 138 MMOL/L (ref 133–144)
PLATELET COUNT: 191 K/MM3 (ref 150–450)
RED BLOOD CELL COUNT: 4.04 M/MM3 (ref 3.63–5.04)
RED CELL DISTRIBUTIO: 13.1 % (ref 11.9–15.9)
WHITE BLOOD CELL COU: 6.9 K/MM3 (ref 4–11)

## 2021-12-21 PROCEDURE — 99239 HOSP IP/OBS DSCHRG MGMT >30: CPT | Performed by: HOSPITALIST

## 2021-12-21 PROCEDURE — 99233 SBSQ HOSP IP/OBS HIGH 50: CPT | Performed by: INTERNAL MEDICINE

## 2021-12-21 NOTE — TELEPHONE ENCOUNTER
Pts spouse Leilani Chapa stated pt is at [facility] floor and admitted for vertigo 12/18/21. Pt is still in the hospital and pt concerned pt not improving as expected. Call Haleynurysantos Chapa to discuss current condition and advise for care.

## 2021-12-21 NOTE — TELEPHONE ENCOUNTER
Per  pt was experiencing vertigo on Friday and having vomiting issues for the past few days.    Pt is currently at [facility].       Pt's  is requesting PT for the patient and she is currently in the hospital. Pt has been cleared fr

## 2021-12-22 ENCOUNTER — TELEPHONE (OUTPATIENT)
Dept: INTERNAL MEDICINE | Age: 66
End: 2021-12-22

## 2022-01-07 ENCOUNTER — OFFICE VISIT (OUTPATIENT)
Dept: NEUROLOGY | Facility: CLINIC | Age: 67
End: 2022-01-07
Payer: COMMERCIAL

## 2022-01-07 ENCOUNTER — TELEPHONE (OUTPATIENT)
Dept: NEUROLOGY | Facility: CLINIC | Age: 67
End: 2022-01-07

## 2022-01-07 VITALS
HEIGHT: 67 IN | BODY MASS INDEX: 26.37 KG/M2 | HEART RATE: 88 BPM | RESPIRATION RATE: 16 BRPM | DIASTOLIC BLOOD PRESSURE: 69 MMHG | WEIGHT: 168 LBS | SYSTOLIC BLOOD PRESSURE: 140 MMHG

## 2022-01-07 DIAGNOSIS — R41.89 COGNITIVE IMPAIRMENT: ICD-10-CM

## 2022-01-07 DIAGNOSIS — H81.90 PERIPHERAL VESTIBULOPATHY, UNSPECIFIED LATERALITY: Primary | ICD-10-CM

## 2022-01-07 PROCEDURE — 99214 OFFICE O/P EST MOD 30 MIN: CPT | Performed by: OTHER

## 2022-01-07 PROCEDURE — 3008F BODY MASS INDEX DOCD: CPT | Performed by: OTHER

## 2022-01-07 PROCEDURE — 3077F SYST BP >= 140 MM HG: CPT | Performed by: OTHER

## 2022-01-07 PROCEDURE — 3078F DIAST BP <80 MM HG: CPT | Performed by: OTHER

## 2022-01-07 RX ORDER — ESCITALOPRAM OXALATE 10 MG/1
TABLET ORAL
Qty: 90 TABLET | Refills: 3 | Status: SHIPPED | OUTPATIENT
Start: 2022-01-07

## 2022-01-07 RX ORDER — ASPIRIN 325 MG
325 TABLET ORAL DAILY
COMMUNITY

## 2022-01-07 RX ORDER — DIAZEPAM 2 MG/1
2 TABLET ORAL 3 TIMES DAILY PRN
Qty: 30 TABLET | Refills: 3 | Status: SHIPPED | OUTPATIENT
Start: 2022-01-07

## 2022-01-07 NOTE — TELEPHONE ENCOUNTER
Dr. Kenton Sever saw patient today and ordered MRI. Patient specified that she would like to have MRI done at:     [address], [place], [address] [place]  P: [phone]. Please call patient when approved.

## 2022-01-07 NOTE — PATIENT INSTRUCTIONS
[address] MD  GAIT-TRAINING EXERCISES  Begin walking with feet on a comfortable distance apart, progress to tandem position (1 jaswinder position as well, wait until it subsides and then sit up again. Perform this exercise 5 times in the morning and 5 times at night until 2 days have passed during which you do not experience vertigo. Repeat 8 counts to each side.

## 2022-01-07 NOTE — PROGRESS NOTES
[facility]  2/39[facility]57[facility]  Primary Care Provider:  Emile Lake    1/7/2022  Accompanied visit:     () No.      78year old yo patient being seen for:  vertigo mouth 2 (two) times daily before meals. , Disp: , Rfl:   •  atorvastatin 40 MG Oral Tab, Take 40 mg by mouth nightly., Disp: , Rfl:   •  Metoprolol Succinate ER 50 MG Oral Tablet 24 Hr, Take 50 mg by mouth., Disp: , Rfl:   •  Omega-3-acid Ethyl Esters 1 g O potential side effects of any treatment relevant to above. Includes explanation of tests as necessary. Return in about 3 months (around 4/7/2022).       Patient understands that if needed, based on condition and or test results, follow up will be readju

## 2022-01-10 ENCOUNTER — APPOINTMENT (OUTPATIENT)
Dept: DERMATOLOGY | Age: 67
End: 2022-01-10

## 2022-01-13 ENCOUNTER — LAB SERVICES (OUTPATIENT)
Dept: LAB | Age: 67
End: 2022-01-13

## 2022-01-13 ENCOUNTER — OFFICE VISIT (OUTPATIENT)
Dept: INTERNAL MEDICINE | Age: 67
End: 2022-01-13

## 2022-01-13 VITALS
HEIGHT: 67 IN | DIASTOLIC BLOOD PRESSURE: 84 MMHG | BODY MASS INDEX: 26.06 KG/M2 | SYSTOLIC BLOOD PRESSURE: 135 MMHG | TEMPERATURE: 97.5 F | RESPIRATION RATE: 18 BRPM | OXYGEN SATURATION: 100 % | HEART RATE: 71 BPM | WEIGHT: 166 LBS

## 2022-01-13 DIAGNOSIS — Z11.59 NEED FOR HEPATITIS C SCREENING TEST: ICD-10-CM

## 2022-01-13 DIAGNOSIS — I10 PRIMARY HYPERTENSION: ICD-10-CM

## 2022-01-13 DIAGNOSIS — Z01.419 WELL WOMAN EXAM: Primary | ICD-10-CM

## 2022-01-13 DIAGNOSIS — E78.2 MIXED HYPERLIPIDEMIA: ICD-10-CM

## 2022-01-13 DIAGNOSIS — R73.01 IMPAIRED FASTING GLUCOSE: ICD-10-CM

## 2022-01-13 DIAGNOSIS — Z11.1 SCREENING EXAMINATION FOR PULMONARY TUBERCULOSIS: ICD-10-CM

## 2022-01-13 DIAGNOSIS — Z12.31 VISIT FOR SCREENING MAMMOGRAM: ICD-10-CM

## 2022-01-13 PROBLEM — H81.399 PERIPHERAL VERTIGO: Status: ACTIVE | Noted: 2019-12-10

## 2022-01-13 LAB
ALBUMIN SERPL-MCNC: 4.7 G/DL (ref 3.6–5.1)
ALP SERPL-CCNC: 127 U/L (ref 45–130)
ALT SERPL W/O P-5'-P-CCNC: 21 U/L (ref 4–38)
AST SERPL-CCNC: 40 U/L (ref 14–43)
BILIRUB SERPL-MCNC: 0.9 MG/DL (ref 0–1.3)
BUN SERPL-MCNC: 23 MG/DL (ref 7–20)
CALCIUM SERPL-MCNC: 9.8 MG/DL (ref 8.6–10.6)
CALCIUM SERPL-MCNC: 9.8 MG/DL (ref 8.6–10.6)
CHLORIDE SERPL-SCNC: 101 MMOL/L (ref 96–107)
CHOLEST SERPL-MCNC: 212 MG/DL (ref 140–200)
CO2 SERPL-SCNC: 27 MMOL/L (ref 22–32)
CREAT SERPL-MCNC: 1 MG/DL (ref 0.5–1.4)
EST. AVERAGE GLUCOSE BLD GHB EST-MCNC: 99 MG/DL (ref 0–154)
GFR SERPL CREATININE-BSD FRML MDRD: 55 ML/MIN/{1.73M2}
GFR SERPL CREATININE-BSD FRML MDRD: >60 ML/MIN/{1.73M2}
GLUCOSE SERPL-MCNC: 103 MG/DL (ref 70–200)
HBA1C MFR BLD: 5.1 % (ref 4.2–6)
HDLC SERPL-MCNC: 89 MG/DL
HEPATITIS C ANTIBODY: NEGATIVE
LDLC SERPL CALC-MCNC: 96 MG/DL (ref 30–100)
POTASSIUM SERPL-SCNC: 6.1 MMOL/L (ref 3.5–5.3)
PROT SERPL-MCNC: 8 G/DL (ref 6.4–8.5)
PTH-INTACT SERPL-MCNC: 80.1 PG/ML (ref 23–73)
SODIUM SERPL-SCNC: 135 MMOL/L (ref 136–146)
TRIGL SERPL-MCNC: 133 MG/DL (ref 0–200)
TSH SERPL DL<=0.05 MIU/L-ACNC: 4.97 M[IU]/L (ref 0.3–4.82)

## 2022-01-13 PROCEDURE — 84443 ASSAY THYROID STIM HORMONE: CPT | Performed by: FAMILY MEDICINE

## 2022-01-13 PROCEDURE — 83970 ASSAY OF PARATHORMONE: CPT | Performed by: FAMILY MEDICINE

## 2022-01-13 PROCEDURE — 80061 LIPID PANEL: CPT | Performed by: FAMILY MEDICINE

## 2022-01-13 PROCEDURE — 36415 COLL VENOUS BLD VENIPUNCTURE: CPT | Performed by: FAMILY MEDICINE

## 2022-01-13 PROCEDURE — 3079F DIAST BP 80-89 MM HG: CPT | Performed by: FAMILY MEDICINE

## 2022-01-13 PROCEDURE — 3075F SYST BP GE 130 - 139MM HG: CPT | Performed by: FAMILY MEDICINE

## 2022-01-13 PROCEDURE — 80053 COMPREHEN METABOLIC PANEL: CPT | Performed by: FAMILY MEDICINE

## 2022-01-13 PROCEDURE — 83036 HEMOGLOBIN GLYCOSYLATED A1C: CPT | Performed by: FAMILY MEDICINE

## 2022-01-13 PROCEDURE — 86803 HEPATITIS C AB TEST: CPT | Performed by: FAMILY MEDICINE

## 2022-01-13 PROCEDURE — 99397 PER PM REEVAL EST PAT 65+ YR: CPT | Performed by: FAMILY MEDICINE

## 2022-01-13 RX ORDER — CARVEDILOL 25 MG/1
25 TABLET ORAL 2 TIMES DAILY WITH MEALS
COMMUNITY
Start: 2021-12-21 | End: 2022-01-24 | Stop reason: SDUPTHER

## 2022-01-13 RX ORDER — DIAZEPAM 2 MG/1
2 TABLET ORAL PRN
COMMUNITY
Start: 2022-01-07 | End: 2022-08-02 | Stop reason: ALTCHOICE

## 2022-01-13 RX ORDER — LOSARTAN POTASSIUM 50 MG/1
50 TABLET ORAL DAILY
COMMUNITY
Start: 2021-12-21 | End: 2022-01-24 | Stop reason: ALTCHOICE

## 2022-01-13 RX ORDER — ASPIRIN 325 MG
325 TABLET ORAL DAILY
COMMUNITY
End: 2023-06-16 | Stop reason: ALTCHOICE

## 2022-01-13 ASSESSMENT — PATIENT HEALTH QUESTIONNAIRE - PHQ9
SUM OF ALL RESPONSES TO PHQ9 QUESTIONS 1 AND 2: 0
SUM OF ALL RESPONSES TO PHQ9 QUESTIONS 1 AND 2: 0
2. FEELING DOWN, DEPRESSED OR HOPELESS: NOT AT ALL
CLINICAL INTERPRETATION OF PHQ2 SCORE: NO FURTHER SCREENING NEEDED
1. LITTLE INTEREST OR PLEASURE IN DOING THINGS: NOT AT ALL

## 2022-01-17 ENCOUNTER — NURSE ONLY (OUTPATIENT)
Dept: INTERNAL MEDICINE | Age: 67
End: 2022-01-17

## 2022-01-17 ENCOUNTER — TELEPHONE (OUTPATIENT)
Dept: NEUROLOGY | Facility: CLINIC | Age: 67
End: 2022-01-17

## 2022-01-17 DIAGNOSIS — Z11.1 SCREENING EXAMINATION FOR PULMONARY TUBERCULOSIS: Primary | ICD-10-CM

## 2022-01-17 PROCEDURE — 86580 TB INTRADERMAL TEST: CPT | Performed by: FAMILY MEDICINE

## 2022-01-17 NOTE — TELEPHONE ENCOUNTER
RN received from [address] [place] [address] PT for physician review and signature. Patient receives PT    Frequency: 2x     Duration: 5 weeks     Plan of Care: ADL Training, Functional/therapeutic Activities, Gait Training.  Pt will improve EC on airex to 30 seconds to r

## 2022-01-18 DIAGNOSIS — E87.5 SERUM POTASSIUM ELEVATED: Primary | ICD-10-CM

## 2022-01-18 DIAGNOSIS — R79.89 ELEVATED TSH: ICD-10-CM

## 2022-01-19 ENCOUNTER — LAB SERVICES (OUTPATIENT)
Dept: LAB | Age: 67
End: 2022-01-19

## 2022-01-19 DIAGNOSIS — E87.5 SERUM POTASSIUM ELEVATED: ICD-10-CM

## 2022-01-19 LAB
INDURATION: 0 MM (ref 0–?)
POTASSIUM SERPL-SCNC: 4.8 MMOL/L (ref 3.5–5.3)
SKIN TEST RESULT: NEGATIVE

## 2022-01-19 PROCEDURE — 84132 ASSAY OF SERUM POTASSIUM: CPT | Performed by: FAMILY MEDICINE

## 2022-01-19 PROCEDURE — 36415 COLL VENOUS BLD VENIPUNCTURE: CPT | Performed by: FAMILY MEDICINE

## 2022-01-24 ENCOUNTER — TELEPHONE (OUTPATIENT)
Dept: INTERNAL MEDICINE | Age: 67
End: 2022-01-24

## 2022-01-24 ENCOUNTER — OFFICE VISIT (OUTPATIENT)
Dept: CARDIOLOGY | Age: 67
End: 2022-01-24

## 2022-01-24 VITALS
OXYGEN SATURATION: 97 % | HEIGHT: 67 IN | WEIGHT: 167.4 LBS | RESPIRATION RATE: 16 BRPM | HEART RATE: 73 BPM | BODY MASS INDEX: 26.27 KG/M2 | SYSTOLIC BLOOD PRESSURE: 112 MMHG | DIASTOLIC BLOOD PRESSURE: 78 MMHG

## 2022-01-24 DIAGNOSIS — R42 VERTIGO: ICD-10-CM

## 2022-01-24 DIAGNOSIS — E78.5 HYPERLIPIDEMIA, UNSPECIFIED HYPERLIPIDEMIA TYPE: ICD-10-CM

## 2022-01-24 DIAGNOSIS — I10 PRIMARY HYPERTENSION: Primary | ICD-10-CM

## 2022-01-24 DIAGNOSIS — I65.23 BILATERAL CAROTID ARTERY STENOSIS: ICD-10-CM

## 2022-01-24 PROCEDURE — 3078F DIAST BP <80 MM HG: CPT | Performed by: PHYSICIAN ASSISTANT

## 2022-01-24 PROCEDURE — 3074F SYST BP LT 130 MM HG: CPT | Performed by: PHYSICIAN ASSISTANT

## 2022-01-24 PROCEDURE — 99214 OFFICE O/P EST MOD 30 MIN: CPT | Performed by: PHYSICIAN ASSISTANT

## 2022-01-24 RX ORDER — CARVEDILOL 25 MG/1
25 TABLET ORAL 2 TIMES DAILY WITH MEALS
Qty: 60 TABLET | Refills: 3 | Status: SHIPPED | OUTPATIENT
Start: 2022-01-24 | End: 2022-05-17

## 2022-01-24 RX ORDER — ONDANSETRON HYDROCHLORIDE 8 MG/1
8 TABLET, FILM COATED ORAL EVERY 8 HOURS PRN
COMMUNITY
End: 2023-02-02 | Stop reason: ALTCHOICE

## 2022-01-24 ASSESSMENT — PAIN SCALES - GENERAL: PAINLEVEL: 0

## 2022-01-24 ASSESSMENT — PATIENT HEALTH QUESTIONNAIRE - PHQ9
SUM OF ALL RESPONSES TO PHQ9 QUESTIONS 1 AND 2: 0
2. FEELING DOWN, DEPRESSED OR HOPELESS: NOT AT ALL
1. LITTLE INTEREST OR PLEASURE IN DOING THINGS: NOT AT ALL
SUM OF ALL RESPONSES TO PHQ9 QUESTIONS 1 AND 2: 0
CLINICAL INTERPRETATION OF PHQ2 SCORE: NO FURTHER SCREENING NEEDED

## 2022-01-27 ENCOUNTER — APPOINTMENT (OUTPATIENT)
Dept: INTERNAL MEDICINE | Age: 67
End: 2022-01-27

## 2022-01-31 ENCOUNTER — TELEPHONE (OUTPATIENT)
Dept: MAMMOGRAPHY | Age: 67
End: 2022-01-31

## 2022-01-31 ENCOUNTER — IMAGING SERVICES (OUTPATIENT)
Dept: OTHER | Age: 67
End: 2022-01-31
Attending: NURSE PRACTITIONER

## 2022-01-31 ENCOUNTER — IMAGING SERVICES (OUTPATIENT)
Dept: MAMMOGRAPHY | Age: 67
End: 2022-01-31
Attending: FAMILY MEDICINE

## 2022-01-31 DIAGNOSIS — Z01.419 WELL WOMAN EXAM: ICD-10-CM

## 2022-01-31 DIAGNOSIS — Z12.31 VISIT FOR SCREENING MAMMOGRAM: ICD-10-CM

## 2022-01-31 PROCEDURE — 77067 SCR MAMMO BI INCL CAD: CPT | Performed by: RADIOLOGY

## 2022-01-31 PROCEDURE — 77063 BREAST TOMOSYNTHESIS BI: CPT | Performed by: RADIOLOGY

## 2022-02-04 ENCOUNTER — IMAGING SERVICES (OUTPATIENT)
Dept: MAMMOGRAPHY | Age: 67
End: 2022-02-04
Attending: FAMILY MEDICINE

## 2022-02-04 ENCOUNTER — IMAGING SERVICES (OUTPATIENT)
Dept: OTHER | Age: 67
End: 2022-02-04
Attending: NURSE PRACTITIONER

## 2022-02-04 DIAGNOSIS — R92.8 ABNORMAL MAMMOGRAM: Primary | ICD-10-CM

## 2022-02-04 DIAGNOSIS — R92.8 ABNORMAL MAMMOGRAM: ICD-10-CM

## 2022-02-04 PROCEDURE — 77065 DX MAMMO INCL CAD UNI: CPT | Performed by: RADIOLOGY

## 2022-02-07 ENCOUNTER — TELEPHONE (OUTPATIENT)
Dept: MAMMOGRAPHY | Age: 67
End: 2022-02-07

## 2022-02-09 ENCOUNTER — WALK IN (OUTPATIENT)
Dept: URGENT CARE | Age: 67
End: 2022-02-09

## 2022-02-09 VITALS
TEMPERATURE: 96.8 F | DIASTOLIC BLOOD PRESSURE: 94 MMHG | RESPIRATION RATE: 16 BRPM | OXYGEN SATURATION: 100 % | HEART RATE: 74 BPM | SYSTOLIC BLOOD PRESSURE: 170 MMHG

## 2022-02-09 DIAGNOSIS — R30.0 DYSURIA: Primary | ICD-10-CM

## 2022-02-09 DIAGNOSIS — N39.0 URINARY TRACT INFECTION WITHOUT HEMATURIA, SITE UNSPECIFIED: ICD-10-CM

## 2022-02-09 LAB
BILIRUBIN URINE: NEGATIVE
BLOOD URINE: ABNORMAL
CLARITY: ABNORMAL
COLOR: YELLOW
GLUCOSE QUALITATIVE U: NEGATIVE
KETONES, URINE: ABNORMAL
LEUKOCYTE ESTERASE URINE: ABNORMAL
MUCOUS: ABNORMAL
NITRITE URINE: NEGATIVE
PH URINE: 5 (ref 5–7)
RED BLOOD CELLS URINE: >100 (ref 0–3)
SPECIFIC GRAVITY URINE: 1.03 (ref 1–1.03)
SQUAMOUS EPITHELIAL CELLS: ABNORMAL
URINE PROTEIN, QUAL (DIPSTICK): 100
UROBILINOGEN URINE: 2
WHITE BLOOD CELLS URINE: >100 (ref 0–5)

## 2022-02-09 PROCEDURE — 87086 URINE CULTURE/COLONY COUNT: CPT | Performed by: FAMILY MEDICINE

## 2022-02-09 PROCEDURE — 87088 URINE BACTERIA CULTURE: CPT | Performed by: FAMILY MEDICINE

## 2022-02-09 PROCEDURE — 99214 OFFICE O/P EST MOD 30 MIN: CPT | Performed by: INTERNAL MEDICINE

## 2022-02-09 PROCEDURE — 81003 URINALYSIS AUTO W/O SCOPE: CPT | Performed by: FAMILY MEDICINE

## 2022-02-09 PROCEDURE — 3080F DIAST BP >= 90 MM HG: CPT | Performed by: INTERNAL MEDICINE

## 2022-02-09 PROCEDURE — 87186 SC STD MICRODIL/AGAR DIL: CPT | Performed by: FAMILY MEDICINE

## 2022-02-09 PROCEDURE — 3077F SYST BP >= 140 MM HG: CPT | Performed by: INTERNAL MEDICINE

## 2022-02-09 RX ORDER — CEFUROXIME AXETIL 500 MG/1
500 TABLET ORAL 2 TIMES DAILY
Qty: 14 TABLET | Refills: 0 | Status: SHIPPED | OUTPATIENT
Start: 2022-02-09 | End: 2022-02-16

## 2022-02-11 LAB — FINAL REPORT: ABNORMAL

## 2022-02-11 RX ORDER — SULFAMETHOXAZOLE AND TRIMETHOPRIM 800; 160 MG/1; MG/1
1 TABLET ORAL DAILY
Qty: 7 TABLET | Refills: 0 | Status: SHIPPED | OUTPATIENT
Start: 2022-02-11 | End: 2022-02-18

## 2022-02-14 ENCOUNTER — HOSPITAL ENCOUNTER (OUTPATIENT)
Dept: MAMMOGRAPHY | Age: 67
Discharge: HOME | End: 2022-02-14
Attending: NURSE PRACTITIONER

## 2022-02-14 DIAGNOSIS — R92.8 ABNORMAL MAMMOGRAM: ICD-10-CM

## 2022-02-14 PROCEDURE — 19081 BX BREAST 1ST LESION STRTCTC: CPT

## 2022-02-14 PROCEDURE — 10002801 HB RX 250 W/O HCPCS: Performed by: RADIOLOGY

## 2022-02-14 PROCEDURE — 88305 TISSUE EXAM BY PATHOLOGIST: CPT | Performed by: NURSE PRACTITIONER

## 2022-02-14 PROCEDURE — 10006027 HB SUPPLY 278

## 2022-02-14 PROCEDURE — A4648 IMPLANTABLE TISSUE MARKER: HCPCS

## 2022-02-14 PROCEDURE — 10006023 HB SUPPLY 272

## 2022-02-14 RX ORDER — LIDOCAINE HYDROCHLORIDE AND EPINEPHRINE BITARTRATE 20; .01 MG/ML; MG/ML
INJECTION, SOLUTION SUBCUTANEOUS PRN
Status: COMPLETED | OUTPATIENT
Start: 2022-02-14 | End: 2022-02-14

## 2022-02-14 RX ORDER — LIDOCAINE HYDROCHLORIDE 20 MG/ML
INJECTION, SOLUTION INFILTRATION; PERINEURAL PRN
Status: COMPLETED | OUTPATIENT
Start: 2022-02-14 | End: 2022-02-14

## 2022-02-14 RX ADMIN — LIDOCAINE HYDROCHLORIDE,EPINEPHRINE BITARTRATE 10 ML: 20; .01 INJECTION, SOLUTION INFILTRATION; PERINEURAL at 09:10

## 2022-02-14 RX ADMIN — LIDOCAINE HYDROCHLORIDE 2 ML: 20 INJECTION, SOLUTION INFILTRATION; PERINEURAL at 09:08

## 2022-02-16 LAB
ASR DISCLAIMER: NORMAL
CASE RPRT: NORMAL
CLINICAL INFO: NORMAL
PATH REPORT.FINAL DX SPEC: NORMAL
PATH REPORT.GROSS SPEC: NORMAL

## 2022-02-17 ENCOUNTER — TELEPHONE (OUTPATIENT)
Dept: MAMMOGRAPHY | Age: 67
End: 2022-02-17

## 2022-02-18 ENCOUNTER — TELEPHONE (OUTPATIENT)
Dept: INTERNAL MEDICINE | Age: 67
End: 2022-02-18

## 2022-02-22 ENCOUNTER — APPOINTMENT (OUTPATIENT)
Dept: DERMATOLOGY | Age: 67
End: 2022-02-22

## 2022-03-28 ENCOUNTER — TELEPHONE (OUTPATIENT)
Dept: SURGERY | Age: 67
End: 2022-03-28

## 2022-04-21 ENCOUNTER — OFFICE VISIT (OUTPATIENT)
Dept: SURGERY | Age: 67
End: 2022-04-21

## 2022-04-21 VITALS — BODY MASS INDEX: 24.33 KG/M2 | WEIGHT: 155 LBS | HEIGHT: 67 IN

## 2022-04-21 DIAGNOSIS — D48.61 NEOPLASM OF UNCERTAIN BEHAVIOR OF RIGHT BREAST: ICD-10-CM

## 2022-04-21 DIAGNOSIS — R92.0 MAMMOGRAPHIC MICROCALCIFICATION: Primary | ICD-10-CM

## 2022-04-21 PROCEDURE — 99243 OFF/OP CNSLTJ NEW/EST LOW 30: CPT | Performed by: SURGERY

## 2022-04-21 RX ORDER — LOSARTAN POTASSIUM 50 MG/1
50 TABLET ORAL 2 TIMES DAILY
Status: SHIPPED | COMMUNITY
Start: 2022-04-21 | End: 2022-07-14 | Stop reason: ALTCHOICE

## 2022-05-17 RX ORDER — CARVEDILOL 25 MG/1
25 TABLET ORAL 2 TIMES DAILY WITH MEALS
Qty: 60 TABLET | Refills: 5 | Status: SHIPPED | OUTPATIENT
Start: 2022-05-17 | End: 2022-11-14

## 2022-05-18 RX ORDER — ATORVASTATIN CALCIUM 40 MG/1
TABLET, FILM COATED ORAL
Qty: 90 TABLET | Refills: 3 | Status: SHIPPED | OUTPATIENT
Start: 2022-05-18 | End: 2022-11-14 | Stop reason: SDUPTHER

## 2022-07-14 ENCOUNTER — OFFICE VISIT (OUTPATIENT)
Dept: INTERNAL MEDICINE | Age: 67
End: 2022-07-14

## 2022-07-14 DIAGNOSIS — M77.8 ELBOW TENDINITIS: ICD-10-CM

## 2022-07-14 DIAGNOSIS — E78.5 HYPERLIPIDEMIA, UNSPECIFIED HYPERLIPIDEMIA TYPE: ICD-10-CM

## 2022-07-14 DIAGNOSIS — I10 PRIMARY HYPERTENSION: Primary | ICD-10-CM

## 2022-07-14 PROCEDURE — 3077F SYST BP >= 140 MM HG: CPT | Performed by: FAMILY MEDICINE

## 2022-07-14 PROCEDURE — 99214 OFFICE O/P EST MOD 30 MIN: CPT | Performed by: FAMILY MEDICINE

## 2022-07-14 RX ORDER — MELOXICAM 15 MG/1
15 TABLET ORAL DAILY
Qty: 30 TABLET | Refills: 0 | Status: SHIPPED | OUTPATIENT
Start: 2022-07-14 | End: 2022-08-02 | Stop reason: ALTCHOICE

## 2022-07-14 RX ORDER — LOSARTAN POTASSIUM AND HYDROCHLOROTHIAZIDE 12.5; 5 MG/1; MG/1
1 TABLET ORAL DAILY
Qty: 30 TABLET | Refills: 0 | Status: SHIPPED | OUTPATIENT
Start: 2022-07-14 | End: 2022-07-26 | Stop reason: DRUGHIGH

## 2022-07-14 ASSESSMENT — PATIENT HEALTH QUESTIONNAIRE - PHQ9
SUM OF ALL RESPONSES TO PHQ9 QUESTIONS 1 AND 2: 0
CLINICAL INTERPRETATION OF PHQ2 SCORE: NO FURTHER SCREENING NEEDED
SUM OF ALL RESPONSES TO PHQ9 QUESTIONS 1 AND 2: 0
2. FEELING DOWN, DEPRESSED OR HOPELESS: NOT AT ALL
1. LITTLE INTEREST OR PLEASURE IN DOING THINGS: NOT AT ALL

## 2022-07-17 VITALS
WEIGHT: 166.4 LBS | TEMPERATURE: 97.3 F | OXYGEN SATURATION: 100 % | HEIGHT: 67 IN | HEART RATE: 97 BPM | SYSTOLIC BLOOD PRESSURE: 160 MMHG | DIASTOLIC BLOOD PRESSURE: 90 MMHG | BODY MASS INDEX: 26.12 KG/M2 | RESPIRATION RATE: 18 BRPM

## 2022-07-26 ENCOUNTER — OFFICE VISIT (OUTPATIENT)
Dept: CARDIOLOGY | Age: 67
End: 2022-07-26

## 2022-07-26 VITALS
OXYGEN SATURATION: 98 % | HEIGHT: 67 IN | DIASTOLIC BLOOD PRESSURE: 58 MMHG | HEART RATE: 78 BPM | BODY MASS INDEX: 25.9 KG/M2 | WEIGHT: 165 LBS | SYSTOLIC BLOOD PRESSURE: 100 MMHG | RESPIRATION RATE: 16 BRPM

## 2022-07-26 DIAGNOSIS — I10 PRIMARY HYPERTENSION: Primary | ICD-10-CM

## 2022-07-26 DIAGNOSIS — I65.23 CAROTID STENOSIS, ASYMPTOMATIC, BILATERAL: ICD-10-CM

## 2022-07-26 DIAGNOSIS — E78.5 HYPERLIPIDEMIA, UNSPECIFIED HYPERLIPIDEMIA TYPE: ICD-10-CM

## 2022-07-26 DIAGNOSIS — R42 DIZZINESS: ICD-10-CM

## 2022-07-26 PROCEDURE — 99214 OFFICE O/P EST MOD 30 MIN: CPT | Performed by: INTERNAL MEDICINE

## 2022-07-26 PROCEDURE — 3078F DIAST BP <80 MM HG: CPT | Performed by: INTERNAL MEDICINE

## 2022-07-26 PROCEDURE — 3074F SYST BP LT 130 MM HG: CPT | Performed by: INTERNAL MEDICINE

## 2022-07-26 RX ORDER — LOSARTAN POTASSIUM AND HYDROCHLOROTHIAZIDE 12.5; 5 MG/1; MG/1
0.5 TABLET ORAL DAILY
Qty: 30 TABLET | Refills: 0 | Status: SHIPPED | COMMUNITY
Start: 2022-07-26 | End: 2022-08-02 | Stop reason: ALTCHOICE

## 2022-07-26 ASSESSMENT — ENCOUNTER SYMPTOMS
DIZZINESS: 0
LIGHT-HEADEDNESS: 1
ABDOMINAL PAIN: 0
SHORTNESS OF BREATH: 0
UNEXPECTED WEIGHT CHANGE: 0
ANAL BLEEDING: 0
FATIGUE: 0
CHEST TIGHTNESS: 0
DIAPHORESIS: 0
HEADACHES: 0

## 2022-08-02 ENCOUNTER — LAB SERVICES (OUTPATIENT)
Dept: LAB | Age: 67
End: 2022-08-02

## 2022-08-02 ENCOUNTER — OFFICE VISIT (OUTPATIENT)
Dept: INTERNAL MEDICINE | Age: 67
End: 2022-08-02

## 2022-08-02 VITALS
HEART RATE: 72 BPM | SYSTOLIC BLOOD PRESSURE: 137 MMHG | RESPIRATION RATE: 18 BRPM | TEMPERATURE: 96.8 F | HEIGHT: 67 IN | BODY MASS INDEX: 25.9 KG/M2 | DIASTOLIC BLOOD PRESSURE: 87 MMHG | OXYGEN SATURATION: 98 % | WEIGHT: 165 LBS

## 2022-08-02 DIAGNOSIS — E87.5 SERUM POTASSIUM ELEVATED: ICD-10-CM

## 2022-08-02 DIAGNOSIS — R73.01 IMPAIRED FASTING GLUCOSE: ICD-10-CM

## 2022-08-02 DIAGNOSIS — R79.89 ELEVATED TSH: ICD-10-CM

## 2022-08-02 DIAGNOSIS — I10 PRIMARY HYPERTENSION: ICD-10-CM

## 2022-08-02 DIAGNOSIS — I10 PRIMARY HYPERTENSION: Primary | ICD-10-CM

## 2022-08-02 PROCEDURE — 80048 BASIC METABOLIC PNL TOTAL CA: CPT | Performed by: FAMILY MEDICINE

## 2022-08-02 PROCEDURE — 3079F DIAST BP 80-89 MM HG: CPT | Performed by: FAMILY MEDICINE

## 2022-08-02 PROCEDURE — 36415 COLL VENOUS BLD VENIPUNCTURE: CPT | Performed by: FAMILY MEDICINE

## 2022-08-02 PROCEDURE — 84443 ASSAY THYROID STIM HORMONE: CPT | Performed by: FAMILY MEDICINE

## 2022-08-02 PROCEDURE — 3075F SYST BP GE 130 - 139MM HG: CPT | Performed by: FAMILY MEDICINE

## 2022-08-02 PROCEDURE — 84439 ASSAY OF FREE THYROXINE: CPT | Performed by: FAMILY MEDICINE

## 2022-08-02 PROCEDURE — 99214 OFFICE O/P EST MOD 30 MIN: CPT | Performed by: FAMILY MEDICINE

## 2022-08-02 RX ORDER — HYDROCHLOROTHIAZIDE 12.5 MG/1
12.5 TABLET ORAL DAILY
Qty: 90 TABLET | Refills: 0 | Status: SHIPPED | OUTPATIENT
Start: 2022-08-02 | End: 2022-08-29 | Stop reason: ALTCHOICE

## 2022-08-02 RX ORDER — LOSARTAN POTASSIUM 25 MG/1
25 TABLET ORAL DAILY
Qty: 90 TABLET | Refills: 0 | Status: SHIPPED | OUTPATIENT
Start: 2022-08-02 | End: 2022-08-29 | Stop reason: ALTCHOICE

## 2022-08-02 ASSESSMENT — PATIENT HEALTH QUESTIONNAIRE - PHQ9
SUM OF ALL RESPONSES TO PHQ9 QUESTIONS 1 AND 2: 0
1. LITTLE INTEREST OR PLEASURE IN DOING THINGS: NOT AT ALL
SUM OF ALL RESPONSES TO PHQ9 QUESTIONS 1 AND 2: 0
2. FEELING DOWN, DEPRESSED OR HOPELESS: NOT AT ALL
CLINICAL INTERPRETATION OF PHQ2 SCORE: NO FURTHER SCREENING NEEDED

## 2022-08-03 LAB
BUN SERPL-MCNC: 17 MG/DL (ref 7–20)
CALCIUM SERPL-MCNC: 9.7 MG/DL (ref 8.6–10.6)
CHLORIDE SERPL-SCNC: 97 MMOL/L (ref 96–107)
CO2 SERPL-SCNC: 34 MMOL/L (ref 22–32)
CREAT SERPL-MCNC: 0.7 MG/DL (ref 0.5–1.4)
GFR SERPL CREATININE-BSD FRML MDRD: >60 ML/MIN/{1.73M2}
GFR SERPL CREATININE-BSD FRML MDRD: >60 ML/MIN/{1.73M2}
GLUCOSE SERPL-MCNC: 100 MG/DL (ref 70–200)
POTASSIUM SERPL-SCNC: 4.4 MMOL/L (ref 3.5–5.3)
SODIUM SERPL-SCNC: 135 MMOL/L (ref 136–146)
T4 FREE SERPL-MCNC: 1.01 NG/DL (ref 0.78–2.19)
TSH SERPL DL<=0.05 MIU/L-ACNC: 5.9 M[IU]/L (ref 0.3–4.82)

## 2022-08-08 ENCOUNTER — IMAGING SERVICES (OUTPATIENT)
Dept: MAMMOGRAPHY | Age: 67
End: 2022-08-08
Attending: SURGERY

## 2022-08-08 DIAGNOSIS — R92.0 MAMMOGRAPHIC MICROCALCIFICATION: ICD-10-CM

## 2022-08-08 PROCEDURE — 77065 DX MAMMO INCL CAD UNI: CPT | Performed by: RADIOLOGY

## 2022-08-08 PROCEDURE — 77061 BREAST TOMOSYNTHESIS UNI: CPT | Performed by: RADIOLOGY

## 2022-08-19 ENCOUNTER — TELEPHONE (OUTPATIENT)
Dept: NEUROLOGY | Facility: CLINIC | Age: 67
End: 2022-08-19

## 2022-08-20 DIAGNOSIS — E78.5 HYPERLIPIDEMIA, UNSPECIFIED HYPERLIPIDEMIA TYPE: ICD-10-CM

## 2022-08-20 DIAGNOSIS — I10 PRIMARY HYPERTENSION: ICD-10-CM

## 2022-08-22 RX ORDER — LOSARTAN POTASSIUM AND HYDROCHLOROTHIAZIDE 12.5; 5 MG/1; MG/1
1 TABLET ORAL DAILY
Qty: 30 TABLET | Refills: 0 | OUTPATIENT
Start: 2022-08-22

## 2022-08-23 DIAGNOSIS — E78.5 HYPERLIPIDEMIA, UNSPECIFIED HYPERLIPIDEMIA TYPE: ICD-10-CM

## 2022-08-23 DIAGNOSIS — I10 PRIMARY HYPERTENSION: ICD-10-CM

## 2022-08-23 RX ORDER — LOSARTAN POTASSIUM AND HYDROCHLOROTHIAZIDE 12.5; 5 MG/1; MG/1
1 TABLET ORAL DAILY
Qty: 30 TABLET | Refills: 0 | OUTPATIENT
Start: 2022-08-23

## 2022-08-27 DIAGNOSIS — I10 PRIMARY HYPERTENSION: ICD-10-CM

## 2022-08-27 DIAGNOSIS — E78.5 HYPERLIPIDEMIA, UNSPECIFIED HYPERLIPIDEMIA TYPE: ICD-10-CM

## 2022-08-29 ENCOUNTER — E-ADVICE (OUTPATIENT)
Dept: INTERNAL MEDICINE | Age: 67
End: 2022-08-29

## 2022-08-29 DIAGNOSIS — I10 PRIMARY HYPERTENSION: ICD-10-CM

## 2022-08-29 RX ORDER — LOSARTAN POTASSIUM AND HYDROCHLOROTHIAZIDE 12.5; 5 MG/1; MG/1
0.5 TABLET ORAL DAILY
Qty: 45 TABLET | Refills: 0 | Status: SHIPPED | OUTPATIENT
Start: 2022-08-29 | End: 2022-11-14 | Stop reason: SDUPTHER

## 2022-08-29 RX ORDER — LOSARTAN POTASSIUM AND HYDROCHLOROTHIAZIDE 12.5; 5 MG/1; MG/1
1 TABLET ORAL DAILY
Qty: 30 TABLET | Refills: 0 | OUTPATIENT
Start: 2022-08-29

## 2022-10-03 RX ORDER — DIAZEPAM 2 MG/1
2 TABLET ORAL 3 TIMES DAILY PRN
Qty: 30 TABLET | Refills: 1 | Status: SHIPPED | OUTPATIENT
Start: 2022-10-03

## 2022-11-14 ENCOUNTER — OFFICE VISIT (OUTPATIENT)
Dept: FAMILY MEDICINE | Age: 67
End: 2022-11-14

## 2022-11-14 VITALS
HEART RATE: 87 BPM | DIASTOLIC BLOOD PRESSURE: 84 MMHG | WEIGHT: 168 LBS | SYSTOLIC BLOOD PRESSURE: 132 MMHG | BODY MASS INDEX: 26.37 KG/M2 | OXYGEN SATURATION: 97 % | RESPIRATION RATE: 18 BRPM | HEIGHT: 67 IN | TEMPERATURE: 98.6 F

## 2022-11-14 DIAGNOSIS — E78.49 OTHER HYPERLIPIDEMIA: ICD-10-CM

## 2022-11-14 DIAGNOSIS — F41.1 GAD (GENERALIZED ANXIETY DISORDER): ICD-10-CM

## 2022-11-14 DIAGNOSIS — I10 PRIMARY HYPERTENSION: ICD-10-CM

## 2022-11-14 DIAGNOSIS — U07.1 COVID-19 VIRUS INFECTION: Primary | ICD-10-CM

## 2022-11-14 PROCEDURE — 99214 OFFICE O/P EST MOD 30 MIN: CPT | Performed by: FAMILY MEDICINE

## 2022-11-14 PROCEDURE — 3079F DIAST BP 80-89 MM HG: CPT | Performed by: FAMILY MEDICINE

## 2022-11-14 PROCEDURE — 3075F SYST BP GE 130 - 139MM HG: CPT | Performed by: FAMILY MEDICINE

## 2022-11-14 RX ORDER — ATORVASTATIN CALCIUM 40 MG/1
40 TABLET, FILM COATED ORAL NIGHTLY
Qty: 90 TABLET | Refills: 3 | Status: SHIPPED | OUTPATIENT
Start: 2022-11-14

## 2022-11-14 RX ORDER — LOSARTAN POTASSIUM AND HYDROCHLOROTHIAZIDE 12.5; 5 MG/1; MG/1
0.5 TABLET ORAL DAILY
Qty: 45 TABLET | Refills: 0 | Status: SHIPPED | OUTPATIENT
Start: 2022-11-14 | End: 2023-02-02 | Stop reason: SDUPTHER

## 2022-11-14 RX ORDER — CARVEDILOL 25 MG/1
25 TABLET ORAL 2 TIMES DAILY WITH MEALS
Qty: 60 TABLET | Refills: 11 | Status: SHIPPED | OUTPATIENT
Start: 2022-11-14

## 2022-11-14 RX ORDER — ESCITALOPRAM OXALATE 10 MG/1
10 TABLET ORAL DAILY
Qty: 90 TABLET | Refills: 0 | Status: SHIPPED | OUTPATIENT
Start: 2022-11-14 | End: 2023-02-02 | Stop reason: SDUPTHER

## 2022-11-14 RX ORDER — DEXTROMETHORPHAN HYDROBROMIDE AND PROMETHAZINE HYDROCHLORIDE 15; 6.25 MG/5ML; MG/5ML
5 SYRUP ORAL 4 TIMES DAILY PRN
Qty: 180 ML | Refills: 0 | Status: SHIPPED | OUTPATIENT
Start: 2022-11-14 | End: 2023-02-02 | Stop reason: ALTCHOICE

## 2022-11-14 RX ORDER — DIAZEPAM 2 MG/1
2 TABLET ORAL
COMMUNITY
Start: 2022-10-03

## 2022-11-14 ASSESSMENT — PATIENT HEALTH QUESTIONNAIRE - PHQ9
SUM OF ALL RESPONSES TO PHQ9 QUESTIONS 1 AND 2: 0
SUM OF ALL RESPONSES TO PHQ9 QUESTIONS 1 AND 2: 0
CLINICAL INTERPRETATION OF PHQ2 SCORE: NO FURTHER SCREENING NEEDED
2. FEELING DOWN, DEPRESSED OR HOPELESS: NOT AT ALL
1. LITTLE INTEREST OR PLEASURE IN DOING THINGS: NOT AT ALL

## 2022-11-14 ASSESSMENT — COGNITIVE AND FUNCTIONAL STATUS - GENERAL
BECAUSE OF A PHYSICAL, MENTAL, OR EMOTIONAL CONDITION, DO YOU HAVE SERIOUS DIFFICULTY CONCENTRATING, REMEMBERING OR MAKING DECISIONS: NO
DO YOU HAVE DIFFICULTY DRESSING OR BATHING: NO
BECAUSE OF A PHYSICAL, MENTAL, OR EMOTIONAL CONDITION, DO YOU HAVE DIFFICULTY DOING ERRANDS ALONE: NO
DO YOU HAVE SERIOUS DIFFICULTY WALKING OR CLIMBING STAIRS: NO

## 2022-11-30 ENCOUNTER — APPOINTMENT (OUTPATIENT)
Dept: CARDIOLOGY | Age: 67
End: 2022-11-30
Attending: INTERNAL MEDICINE

## 2023-02-02 ENCOUNTER — OFFICE VISIT (OUTPATIENT)
Dept: FAMILY MEDICINE | Age: 68
End: 2023-02-02

## 2023-02-02 VITALS
BODY MASS INDEX: 25.9 KG/M2 | TEMPERATURE: 97.2 F | SYSTOLIC BLOOD PRESSURE: 124 MMHG | OXYGEN SATURATION: 98 % | HEART RATE: 73 BPM | WEIGHT: 165 LBS | HEIGHT: 67 IN | DIASTOLIC BLOOD PRESSURE: 81 MMHG | RESPIRATION RATE: 18 BRPM

## 2023-02-02 DIAGNOSIS — F41.1 GAD (GENERALIZED ANXIETY DISORDER): ICD-10-CM

## 2023-02-02 DIAGNOSIS — I10 PRIMARY HYPERTENSION: ICD-10-CM

## 2023-02-02 DIAGNOSIS — Z00.00 MEDICARE ANNUAL WELLNESS VISIT, SUBSEQUENT: Primary | ICD-10-CM

## 2023-02-02 DIAGNOSIS — R79.89 ABNORMAL TSH: ICD-10-CM

## 2023-02-02 DIAGNOSIS — Z78.0 POSTMENOPAUSAL STATE: ICD-10-CM

## 2023-02-02 DIAGNOSIS — E78.49 OTHER HYPERLIPIDEMIA: ICD-10-CM

## 2023-02-02 DIAGNOSIS — R73.01 IMPAIRED FASTING GLUCOSE: ICD-10-CM

## 2023-02-02 LAB
ALBUMIN SERPL-MCNC: 4.1 G/DL (ref 3.6–5.1)
ALBUMIN/GLOB SERPL: 1.2 {RATIO} (ref 1–2.4)
ALP SERPL-CCNC: 133 UNITS/L (ref 45–117)
ALT SERPL-CCNC: 30 UNITS/L
ANION GAP SERPL CALC-SCNC: 16 MMOL/L (ref 7–19)
AST SERPL-CCNC: 40 UNITS/L
BASOPHILS # BLD: 0.1 K/MCL (ref 0–0.3)
BASOPHILS NFR BLD: 1 %
BILIRUB SERPL-MCNC: 0.4 MG/DL (ref 0.2–1)
BUN SERPL-MCNC: 21 MG/DL (ref 6–20)
BUN/CREAT SERPL: 26 (ref 7–25)
CALCIUM SERPL-MCNC: 9.4 MG/DL (ref 8.4–10.2)
CHLORIDE SERPL-SCNC: 101 MMOL/L (ref 97–110)
CHOLEST SERPL-MCNC: 228 MG/DL
CHOLEST/HDLC SERPL: 2.7 {RATIO}
CO2 SERPL-SCNC: 28 MMOL/L (ref 21–32)
CREAT SERPL-MCNC: 0.82 MG/DL (ref 0.51–0.95)
DEPRECATED RDW RBC: 42.9 FL (ref 39–50)
EOSINOPHIL # BLD: 0.2 K/MCL (ref 0–0.5)
EOSINOPHIL NFR BLD: 3 %
ERYTHROCYTE [DISTWIDTH] IN BLOOD: 11.9 % (ref 11–15)
FASTING DURATION TIME PATIENT: ABNORMAL H
FASTING DURATION TIME PATIENT: ABNORMAL H
GFR SERPLBLD BASED ON 1.73 SQ M-ARVRAT: 78 ML/MIN
GLOBULIN SER-MCNC: 3.5 G/DL (ref 2–4)
GLUCOSE SERPL-MCNC: 107 MG/DL (ref 70–99)
HBA1C MFR BLD: 5.3 % (ref 4.5–5.6)
HCT VFR BLD CALC: 41.9 % (ref 36–46.5)
HDLC SERPL-MCNC: 85 MG/DL
HGB BLD-MCNC: 14 G/DL (ref 12–15.5)
IMM GRANULOCYTES # BLD AUTO: 0 K/MCL (ref 0–0.2)
IMM GRANULOCYTES # BLD: 0 %
LDLC SERPL CALC-MCNC: 94 MG/DL
LYMPHOCYTES # BLD: 2 K/MCL (ref 1–4)
LYMPHOCYTES NFR BLD: 28 %
MCH RBC QN AUTO: 32.6 PG (ref 26–34)
MCHC RBC AUTO-ENTMCNC: 33.4 G/DL (ref 32–36.5)
MCV RBC AUTO: 97.7 FL (ref 78–100)
MONOCYTES # BLD: 0.6 K/MCL (ref 0.3–0.9)
MONOCYTES NFR BLD: 9 %
NEUTROPHILS # BLD: 4.1 K/MCL (ref 1.8–7.7)
NEUTROPHILS NFR BLD: 59 %
NONHDLC SERPL-MCNC: 143 MG/DL
NRBC BLD MANUAL-RTO: 0 /100 WBC
PLATELET # BLD AUTO: 322 K/MCL (ref 140–450)
POTASSIUM SERPL-SCNC: 5.2 MMOL/L (ref 3.4–5.1)
PROT SERPL-MCNC: 7.6 G/DL (ref 6.4–8.2)
RBC # BLD: 4.29 MIL/MCL (ref 4–5.2)
SODIUM SERPL-SCNC: 140 MMOL/L (ref 135–145)
T4 FREE SERPL-MCNC: 0.7 NG/DL (ref 0.8–1.5)
TRIGL SERPL-MCNC: 245 MG/DL
TSH SERPL-ACNC: 4.33 MCUNITS/ML (ref 0.35–5)
WBC # BLD: 7 K/MCL (ref 4.2–11)

## 2023-02-02 PROCEDURE — 3074F SYST BP LT 130 MM HG: CPT | Performed by: FAMILY MEDICINE

## 2023-02-02 PROCEDURE — 85025 COMPLETE CBC W/AUTO DIFF WBC: CPT | Performed by: INTERNAL MEDICINE

## 2023-02-02 PROCEDURE — 36415 COLL VENOUS BLD VENIPUNCTURE: CPT | Performed by: FAMILY MEDICINE

## 2023-02-02 PROCEDURE — 3079F DIAST BP 80-89 MM HG: CPT | Performed by: FAMILY MEDICINE

## 2023-02-02 PROCEDURE — 84481 FREE ASSAY (FT-3): CPT | Performed by: INTERNAL MEDICINE

## 2023-02-02 PROCEDURE — 80053 COMPREHEN METABOLIC PANEL: CPT | Performed by: INTERNAL MEDICINE

## 2023-02-02 PROCEDURE — 84439 ASSAY OF FREE THYROXINE: CPT | Performed by: INTERNAL MEDICINE

## 2023-02-02 PROCEDURE — G0439 PPPS, SUBSEQ VISIT: HCPCS | Performed by: FAMILY MEDICINE

## 2023-02-02 PROCEDURE — 80061 LIPID PANEL: CPT | Performed by: INTERNAL MEDICINE

## 2023-02-02 PROCEDURE — 83036 HEMOGLOBIN GLYCOSYLATED A1C: CPT | Performed by: INTERNAL MEDICINE

## 2023-02-02 PROCEDURE — 84443 ASSAY THYROID STIM HORMONE: CPT | Performed by: INTERNAL MEDICINE

## 2023-02-02 PROCEDURE — 99214 OFFICE O/P EST MOD 30 MIN: CPT | Performed by: FAMILY MEDICINE

## 2023-02-02 RX ORDER — ESCITALOPRAM OXALATE 10 MG/1
10 TABLET ORAL DAILY
Qty: 90 TABLET | Refills: 1 | Status: SHIPPED | OUTPATIENT
Start: 2023-02-02 | End: 2023-08-14

## 2023-02-02 RX ORDER — LOSARTAN POTASSIUM AND HYDROCHLOROTHIAZIDE 12.5; 5 MG/1; MG/1
0.5 TABLET ORAL DAILY
Qty: 45 TABLET | Refills: 1 | Status: SHIPPED | OUTPATIENT
Start: 2023-02-02

## 2023-02-02 ASSESSMENT — PATIENT HEALTH QUESTIONNAIRE - PHQ9
SUM OF ALL RESPONSES TO PHQ9 QUESTIONS 1 AND 2: 0
1. LITTLE INTEREST OR PLEASURE IN DOING THINGS: NOT AT ALL
2. FEELING DOWN, DEPRESSED OR HOPELESS: NOT AT ALL
SUM OF ALL RESPONSES TO PHQ9 QUESTIONS 1 AND 2: 0
CLINICAL INTERPRETATION OF PHQ2 SCORE: NO FURTHER SCREENING NEEDED

## 2023-02-03 LAB — T3FREE SERPL-MCNC: 2.4 PG/ML (ref 2.2–4)

## 2023-02-06 ENCOUNTER — IMAGING SERVICES (OUTPATIENT)
Dept: BONE DENSITY | Age: 68
End: 2023-02-06
Attending: FAMILY MEDICINE

## 2023-02-06 DIAGNOSIS — Z78.0 POSTMENOPAUSAL STATE: ICD-10-CM

## 2023-02-06 PROCEDURE — 77080 DXA BONE DENSITY AXIAL: CPT | Performed by: RADIOLOGY

## 2023-02-07 ENCOUNTER — APPOINTMENT (OUTPATIENT)
Dept: CARDIOLOGY | Age: 68
End: 2023-02-07

## 2023-04-04 ENCOUNTER — WALK IN (OUTPATIENT)
Dept: URGENT CARE | Age: 68
End: 2023-04-04

## 2023-04-04 VITALS
OXYGEN SATURATION: 99 % | DIASTOLIC BLOOD PRESSURE: 76 MMHG | SYSTOLIC BLOOD PRESSURE: 138 MMHG | TEMPERATURE: 97.8 F | HEART RATE: 75 BPM | RESPIRATION RATE: 18 BRPM

## 2023-04-04 DIAGNOSIS — N39.0 URINARY TRACT INFECTION WITHOUT HEMATURIA, SITE UNSPECIFIED: ICD-10-CM

## 2023-04-04 DIAGNOSIS — R30.0 DYSURIA: Primary | ICD-10-CM

## 2023-04-04 LAB
APPEARANCE UR: ABNORMAL
BACTERIA #/AREA URNS HPF: ABNORMAL /HPF
BILIRUB UR QL STRIP: ABNORMAL
COLOR UR: ABNORMAL
GLUCOSE UR STRIP-MCNC: ABNORMAL MG/DL
HGB UR QL STRIP: ABNORMAL
HYALINE CASTS #/AREA URNS LPF: ABNORMAL /LPF
KETONES UR STRIP-MCNC: ABNORMAL MG/DL
LEUKOCYTE ESTERASE UR QL STRIP: ABNORMAL
NITRITE UR QL STRIP: ABNORMAL
PH UR STRIP: 6 [PH] (ref 5–7)
PROT UR STRIP-MCNC: ABNORMAL MG/DL
RBC #/AREA URNS HPF: >100 /HPF
SP GR UR STRIP: 1.02 (ref 1–1.03)
SQUAMOUS #/AREA URNS HPF: ABNORMAL /HPF
UROBILINOGEN UR STRIP-MCNC: ABNORMAL MG/DL
WBC #/AREA URNS HPF: >100 /HPF

## 2023-04-04 PROCEDURE — 3075F SYST BP GE 130 - 139MM HG: CPT | Performed by: INTERNAL MEDICINE

## 2023-04-04 PROCEDURE — 81001 URINALYSIS AUTO W/SCOPE: CPT | Performed by: INTERNAL MEDICINE

## 2023-04-04 PROCEDURE — 99214 OFFICE O/P EST MOD 30 MIN: CPT | Performed by: INTERNAL MEDICINE

## 2023-04-04 PROCEDURE — 3078F DIAST BP <80 MM HG: CPT | Performed by: INTERNAL MEDICINE

## 2023-04-04 PROCEDURE — 87086 URINE CULTURE/COLONY COUNT: CPT | Performed by: INTERNAL MEDICINE

## 2023-04-04 RX ORDER — CEFUROXIME AXETIL 500 MG/1
500 TABLET ORAL 2 TIMES DAILY
Qty: 14 TABLET | Refills: 0 | Status: SHIPPED | OUTPATIENT
Start: 2023-04-04 | End: 2023-04-11

## 2023-04-05 LAB — BACTERIA UR CULT: NORMAL

## 2023-05-02 ENCOUNTER — APPOINTMENT (OUTPATIENT)
Dept: CARDIOLOGY | Age: 68
End: 2023-05-02

## 2023-05-30 DIAGNOSIS — U07.1 COVID-19 VIRUS INFECTION: ICD-10-CM

## 2023-05-30 RX ORDER — DEXTROMETHORPHAN HYDROBROMIDE AND PROMETHAZINE HYDROCHLORIDE 15; 6.25 MG/5ML; MG/5ML
SYRUP ORAL
Qty: 180 ML | Refills: 0 | OUTPATIENT
Start: 2023-05-30

## 2023-05-31 ENCOUNTER — WALK IN (OUTPATIENT)
Dept: URGENT CARE | Age: 68
End: 2023-05-31

## 2023-05-31 VITALS
RESPIRATION RATE: 18 BRPM | TEMPERATURE: 98.4 F | SYSTOLIC BLOOD PRESSURE: 142 MMHG | HEART RATE: 80 BPM | OXYGEN SATURATION: 97 % | DIASTOLIC BLOOD PRESSURE: 82 MMHG

## 2023-05-31 DIAGNOSIS — R05.9 COUGH, UNSPECIFIED TYPE: Primary | ICD-10-CM

## 2023-05-31 LAB
INTERNAL PROCEDURAL CONTROLS ACCEPTABLE: YES
SARS-COV+SARS-COV-2 AG RESP QL IA.RAPID: NOT DETECTED
TEST LOT EXPIRATION DATE: NORMAL
TEST LOT NUMBER: NORMAL

## 2023-05-31 PROCEDURE — 3079F DIAST BP 80-89 MM HG: CPT | Performed by: FAMILY MEDICINE

## 2023-05-31 PROCEDURE — 87426 SARSCOV CORONAVIRUS AG IA: CPT | Performed by: FAMILY MEDICINE

## 2023-05-31 PROCEDURE — 3077F SYST BP >= 140 MM HG: CPT | Performed by: FAMILY MEDICINE

## 2023-05-31 PROCEDURE — 99214 OFFICE O/P EST MOD 30 MIN: CPT | Performed by: FAMILY MEDICINE

## 2023-05-31 RX ORDER — DEXTROMETHORPHAN HYDROBROMIDE AND PROMETHAZINE HYDROCHLORIDE 15; 6.25 MG/5ML; MG/5ML
5 SYRUP ORAL 4 TIMES DAILY PRN
Qty: 120 ML | Refills: 0 | Status: SHIPPED | OUTPATIENT
Start: 2023-05-31 | End: 2023-06-10

## 2023-06-02 ENCOUNTER — NURSE TRIAGE (OUTPATIENT)
Dept: FAMILY MEDICINE | Age: 68
End: 2023-06-02

## 2023-06-03 ENCOUNTER — TELEPHONE (OUTPATIENT)
Dept: FAMILY MEDICINE | Age: 68
End: 2023-06-03

## 2023-06-03 ENCOUNTER — IMAGING SERVICES (OUTPATIENT)
Dept: GENERAL RADIOLOGY | Age: 68
End: 2023-06-03
Attending: FAMILY MEDICINE

## 2023-06-03 ENCOUNTER — OFFICE VISIT (OUTPATIENT)
Dept: FAMILY MEDICINE | Age: 68
End: 2023-06-03

## 2023-06-03 VITALS
HEART RATE: 67 BPM | DIASTOLIC BLOOD PRESSURE: 62 MMHG | WEIGHT: 165.2 LBS | HEIGHT: 67 IN | RESPIRATION RATE: 18 BRPM | TEMPERATURE: 97 F | BODY MASS INDEX: 25.93 KG/M2 | OXYGEN SATURATION: 100 % | SYSTOLIC BLOOD PRESSURE: 100 MMHG

## 2023-06-03 DIAGNOSIS — J20.8 ACUTE BRONCHITIS DUE TO OTHER SPECIFIED ORGANISMS: ICD-10-CM

## 2023-06-03 DIAGNOSIS — J20.8 ACUTE BRONCHITIS DUE TO OTHER SPECIFIED ORGANISMS: Primary | ICD-10-CM

## 2023-06-03 PROCEDURE — 99214 OFFICE O/P EST MOD 30 MIN: CPT | Performed by: FAMILY MEDICINE

## 2023-06-03 PROCEDURE — 3078F DIAST BP <80 MM HG: CPT | Performed by: FAMILY MEDICINE

## 2023-06-03 PROCEDURE — 3074F SYST BP LT 130 MM HG: CPT | Performed by: FAMILY MEDICINE

## 2023-06-03 PROCEDURE — 71046 X-RAY EXAM CHEST 2 VIEWS: CPT | Performed by: RADIOLOGY

## 2023-06-03 RX ORDER — AMOXICILLIN AND CLAVULANATE POTASSIUM 875; 125 MG/1; MG/1
1 TABLET, FILM COATED ORAL 2 TIMES DAILY
Qty: 14 TABLET | Refills: 0 | Status: SHIPPED | OUTPATIENT
Start: 2023-06-03 | End: 2023-06-10

## 2023-06-03 RX ORDER — CODEINE PHOSPHATE AND GUAIFENESIN 10; 100 MG/5ML; MG/5ML
10 SOLUTION ORAL 3 TIMES DAILY PRN
Qty: 180 ML | Refills: 0 | Status: SHIPPED | OUTPATIENT
Start: 2023-06-03

## 2023-06-03 ASSESSMENT — PATIENT HEALTH QUESTIONNAIRE - PHQ9
SUM OF ALL RESPONSES TO PHQ9 QUESTIONS 1 AND 2: 0
2. FEELING DOWN, DEPRESSED OR HOPELESS: NOT AT ALL
1. LITTLE INTEREST OR PLEASURE IN DOING THINGS: NOT AT ALL
CLINICAL INTERPRETATION OF PHQ2 SCORE: NO FURTHER SCREENING NEEDED
SUM OF ALL RESPONSES TO PHQ9 QUESTIONS 1 AND 2: 0

## 2023-06-15 ENCOUNTER — E-ADVICE (OUTPATIENT)
Dept: FAMILY MEDICINE | Age: 68
End: 2023-06-15

## 2023-06-16 ENCOUNTER — OFFICE VISIT (OUTPATIENT)
Dept: FAMILY MEDICINE | Age: 68
End: 2023-06-16

## 2023-06-16 DIAGNOSIS — K21.9 GASTROESOPHAGEAL REFLUX DISEASE WITHOUT ESOPHAGITIS: ICD-10-CM

## 2023-06-16 DIAGNOSIS — I10 PRIMARY HYPERTENSION: ICD-10-CM

## 2023-06-16 DIAGNOSIS — R05.8 PRODUCTIVE COUGH: Primary | ICD-10-CM

## 2023-06-16 DIAGNOSIS — R53.83 FATIGUE, UNSPECIFIED TYPE: ICD-10-CM

## 2023-06-16 DIAGNOSIS — K22.10 ULCER OF ESOPHAGUS WITHOUT BLEEDING: ICD-10-CM

## 2023-06-16 DIAGNOSIS — R05.3 PERSISTENT COUGH: ICD-10-CM

## 2023-06-16 LAB
ALBUMIN SERPL-MCNC: 3.7 G/DL (ref 3.6–5.1)
ALBUMIN/GLOB SERPL: 1 {RATIO} (ref 1–2.4)
ALP SERPL-CCNC: 107 UNITS/L (ref 45–117)
ALT SERPL-CCNC: 23 UNITS/L
ANION GAP SERPL CALC-SCNC: 13 MMOL/L (ref 7–19)
APPEARANCE UR: CLEAR
AST SERPL-CCNC: 25 UNITS/L
BACTERIA #/AREA URNS HPF: ABNORMAL /HPF
BASOPHILS # BLD: 0.1 K/MCL (ref 0–0.3)
BASOPHILS NFR BLD: 1 %
BILIRUB SERPL-MCNC: 0.5 MG/DL (ref 0.2–1)
BILIRUB UR QL STRIP: NEGATIVE
BUN SERPL-MCNC: 9 MG/DL (ref 6–20)
BUN/CREAT SERPL: 11 (ref 7–25)
CALCIUM SERPL-MCNC: 9.3 MG/DL (ref 8.4–10.2)
CHLORIDE SERPL-SCNC: 101 MMOL/L (ref 97–110)
CO2 SERPL-SCNC: 29 MMOL/L (ref 21–32)
COLOR UR: ABNORMAL
CREAT SERPL-MCNC: 0.82 MG/DL (ref 0.51–0.95)
DEPRECATED RDW RBC: 41.9 FL (ref 39–50)
EOSINOPHIL # BLD: 0.3 K/MCL (ref 0–0.5)
EOSINOPHIL NFR BLD: 3 %
ERYTHROCYTE [DISTWIDTH] IN BLOOD: 11.6 % (ref 11–15)
FASTING DURATION TIME PATIENT: ABNORMAL H
GFR SERPLBLD BASED ON 1.73 SQ M-ARVRAT: 77 ML/MIN
GLOBULIN SER-MCNC: 3.6 G/DL (ref 2–4)
GLUCOSE SERPL-MCNC: 102 MG/DL (ref 70–99)
GLUCOSE UR STRIP-MCNC: NEGATIVE MG/DL
HCT VFR BLD CALC: 39.1 % (ref 36–46.5)
HGB BLD-MCNC: 12.4 G/DL (ref 12–15.5)
HGB UR QL STRIP: NEGATIVE
HYALINE CASTS #/AREA URNS LPF: ABNORMAL /LPF
IMM GRANULOCYTES # BLD AUTO: 0 K/MCL (ref 0–0.2)
IMM GRANULOCYTES # BLD: 0 %
KETONES UR STRIP-MCNC: NEGATIVE MG/DL
LEUKOCYTE ESTERASE UR QL STRIP: ABNORMAL
LYMPHOCYTES # BLD: 2.8 K/MCL (ref 1–4)
LYMPHOCYTES NFR BLD: 28 %
MCH RBC QN AUTO: 31.3 PG (ref 26–34)
MCHC RBC AUTO-ENTMCNC: 31.7 G/DL (ref 32–36.5)
MCV RBC AUTO: 98.7 FL (ref 78–100)
MONOCYTES # BLD: 0.8 K/MCL (ref 0.3–0.9)
MONOCYTES NFR BLD: 8 %
MUCOUS THREADS URNS QL MICRO: PRESENT
NEUTROPHILS # BLD: 6 K/MCL (ref 1.8–7.7)
NEUTROPHILS NFR BLD: 60 %
NITRITE UR QL STRIP: NEGATIVE
NRBC BLD MANUAL-RTO: 0 /100 WBC
PH UR STRIP: 6 [PH] (ref 5–7)
PLATELET # BLD AUTO: 421 K/MCL (ref 140–450)
POTASSIUM SERPL-SCNC: 5.1 MMOL/L (ref 3.4–5.1)
PROT SERPL-MCNC: 7.3 G/DL (ref 6.4–8.2)
PROT UR STRIP-MCNC: NEGATIVE MG/DL
RBC # BLD: 3.96 MIL/MCL (ref 4–5.2)
RBC #/AREA URNS HPF: ABNORMAL /HPF
SODIUM SERPL-SCNC: 138 MMOL/L (ref 135–145)
SP GR UR STRIP: 1 (ref 1–1.03)
SQUAMOUS #/AREA URNS HPF: ABNORMAL /HPF
TSH SERPL-ACNC: 4.88 MCUNITS/ML (ref 0.35–5)
UROBILINOGEN UR STRIP-MCNC: 0.2 MG/DL
WBC # BLD: 10 K/MCL (ref 4.2–11)
WBC #/AREA URNS HPF: ABNORMAL /HPF

## 2023-06-16 PROCEDURE — 84443 ASSAY THYROID STIM HORMONE: CPT | Performed by: INTERNAL MEDICINE

## 2023-06-16 PROCEDURE — 80053 COMPREHEN METABOLIC PANEL: CPT | Performed by: INTERNAL MEDICINE

## 2023-06-16 PROCEDURE — 36415 COLL VENOUS BLD VENIPUNCTURE: CPT | Performed by: FAMILY MEDICINE

## 2023-06-16 PROCEDURE — 99215 OFFICE O/P EST HI 40 MIN: CPT | Performed by: FAMILY MEDICINE

## 2023-06-16 PROCEDURE — 87086 URINE CULTURE/COLONY COUNT: CPT | Performed by: INTERNAL MEDICINE

## 2023-06-16 PROCEDURE — 3078F DIAST BP <80 MM HG: CPT | Performed by: FAMILY MEDICINE

## 2023-06-16 PROCEDURE — 3075F SYST BP GE 130 - 139MM HG: CPT | Performed by: FAMILY MEDICINE

## 2023-06-16 PROCEDURE — 81001 URINALYSIS AUTO W/SCOPE: CPT | Performed by: INTERNAL MEDICINE

## 2023-06-16 PROCEDURE — 85025 COMPLETE CBC W/AUTO DIFF WBC: CPT | Performed by: INTERNAL MEDICINE

## 2023-06-16 RX ORDER — BENZONATATE 100 MG/1
100 CAPSULE ORAL 3 TIMES DAILY PRN
Qty: 30 CAPSULE | Refills: 0 | Status: SHIPPED | OUTPATIENT
Start: 2023-06-16 | End: 2023-06-29 | Stop reason: SDUPTHER

## 2023-06-16 RX ORDER — PANTOPRAZOLE SODIUM 40 MG/1
40 TABLET, DELAYED RELEASE ORAL DAILY
Qty: 30 TABLET | Refills: 2 | Status: SHIPPED | OUTPATIENT
Start: 2023-06-16 | End: 2023-08-30

## 2023-06-16 RX ORDER — ALBUTEROL SULFATE 90 UG/1
2 AEROSOL, METERED RESPIRATORY (INHALATION) EVERY 4 HOURS PRN
Qty: 1 EACH | Refills: 0 | Status: SHIPPED | OUTPATIENT
Start: 2023-06-16 | End: 2023-07-17 | Stop reason: SDUPTHER

## 2023-06-16 ASSESSMENT — PATIENT HEALTH QUESTIONNAIRE - PHQ9
1. LITTLE INTEREST OR PLEASURE IN DOING THINGS: NOT AT ALL
SUM OF ALL RESPONSES TO PHQ9 QUESTIONS 1 AND 2: 0
CLINICAL INTERPRETATION OF PHQ2 SCORE: NO FURTHER SCREENING NEEDED
SUM OF ALL RESPONSES TO PHQ9 QUESTIONS 1 AND 2: 0
2. FEELING DOWN, DEPRESSED OR HOPELESS: NOT AT ALL

## 2023-06-18 LAB — BACTERIA UR CULT: NORMAL

## 2023-06-19 VITALS
HEIGHT: 67 IN | WEIGHT: 166.6 LBS | BODY MASS INDEX: 26.15 KG/M2 | HEART RATE: 61 BPM | SYSTOLIC BLOOD PRESSURE: 139 MMHG | RESPIRATION RATE: 18 BRPM | OXYGEN SATURATION: 97 % | TEMPERATURE: 98 F | DIASTOLIC BLOOD PRESSURE: 79 MMHG

## 2023-06-29 DIAGNOSIS — R05.8 PRODUCTIVE COUGH: ICD-10-CM

## 2023-06-30 RX ORDER — BENZONATATE 100 MG/1
100 CAPSULE ORAL 3 TIMES DAILY PRN
Qty: 30 CAPSULE | Refills: 0 | OUTPATIENT
Start: 2023-06-30

## 2023-06-30 RX ORDER — BENZONATATE 100 MG/1
100 CAPSULE ORAL 3 TIMES DAILY PRN
Qty: 30 CAPSULE | Refills: 0 | Status: SHIPPED | OUTPATIENT
Start: 2023-06-30

## 2023-07-17 ENCOUNTER — OFFICE VISIT (OUTPATIENT)
Dept: FAMILY MEDICINE | Age: 68
End: 2023-07-17

## 2023-07-17 VITALS
DIASTOLIC BLOOD PRESSURE: 82 MMHG | OXYGEN SATURATION: 96 % | TEMPERATURE: 97.6 F | RESPIRATION RATE: 18 BRPM | SYSTOLIC BLOOD PRESSURE: 136 MMHG | HEART RATE: 65 BPM | WEIGHT: 164.8 LBS | BODY MASS INDEX: 25.87 KG/M2 | HEIGHT: 67 IN

## 2023-07-17 DIAGNOSIS — Z12.31 VISIT FOR SCREENING MAMMOGRAM: ICD-10-CM

## 2023-07-17 DIAGNOSIS — R05.8 PRODUCTIVE COUGH: Primary | ICD-10-CM

## 2023-07-17 DIAGNOSIS — I10 PRIMARY HYPERTENSION: ICD-10-CM

## 2023-07-17 PROCEDURE — 99214 OFFICE O/P EST MOD 30 MIN: CPT | Performed by: FAMILY MEDICINE

## 2023-07-17 PROCEDURE — 3075F SYST BP GE 130 - 139MM HG: CPT | Performed by: FAMILY MEDICINE

## 2023-07-17 PROCEDURE — 3079F DIAST BP 80-89 MM HG: CPT | Performed by: FAMILY MEDICINE

## 2023-07-17 RX ORDER — FLUTICASONE PROPIONATE AND SALMETEROL XINAFOATE 230; 21 UG/1; UG/1
2 AEROSOL, METERED RESPIRATORY (INHALATION) 2 TIMES DAILY
Qty: 12 G | Refills: 1 | Status: SHIPPED | OUTPATIENT
Start: 2023-07-17

## 2023-07-17 RX ORDER — AMLODIPINE BESYLATE 5 MG/1
5 TABLET ORAL DAILY
Qty: 30 TABLET | Refills: 2 | Status: SHIPPED | OUTPATIENT
Start: 2023-07-17

## 2023-07-17 RX ORDER — ALBUTEROL SULFATE 90 UG/1
2 AEROSOL, METERED RESPIRATORY (INHALATION) EVERY 4 HOURS PRN
Qty: 1 EACH | Refills: 0 | Status: SHIPPED | OUTPATIENT
Start: 2023-07-17

## 2023-07-17 ASSESSMENT — PATIENT HEALTH QUESTIONNAIRE - PHQ9
CLINICAL INTERPRETATION OF PHQ2 SCORE: NO FURTHER SCREENING NEEDED
1. LITTLE INTEREST OR PLEASURE IN DOING THINGS: NOT AT ALL
SUM OF ALL RESPONSES TO PHQ9 QUESTIONS 1 AND 2: 0
SUM OF ALL RESPONSES TO PHQ9 QUESTIONS 1 AND 2: 0
2. FEELING DOWN, DEPRESSED OR HOPELESS: NOT AT ALL

## 2023-07-19 ENCOUNTER — TELEPHONE (OUTPATIENT)
Dept: FAMILY MEDICINE | Age: 68
End: 2023-07-19

## 2023-07-21 ENCOUNTER — IMAGING SERVICES (OUTPATIENT)
Dept: MAMMOGRAPHY | Age: 68
End: 2023-07-21
Attending: FAMILY MEDICINE

## 2023-07-21 ENCOUNTER — IMAGING SERVICES (OUTPATIENT)
Dept: CT IMAGING | Age: 68
End: 2023-07-21
Attending: FAMILY MEDICINE

## 2023-07-21 DIAGNOSIS — R05.8 PRODUCTIVE COUGH: ICD-10-CM

## 2023-07-21 DIAGNOSIS — Z12.31 VISIT FOR SCREENING MAMMOGRAM: ICD-10-CM

## 2023-07-21 PROCEDURE — 77067 SCR MAMMO BI INCL CAD: CPT | Performed by: RADIOLOGY

## 2023-07-21 PROCEDURE — 77063 BREAST TOMOSYNTHESIS BI: CPT | Performed by: RADIOLOGY

## 2023-07-21 PROCEDURE — 71250 CT THORAX DX C-: CPT | Performed by: RADIOLOGY

## 2023-07-28 ENCOUNTER — APPOINTMENT (OUTPATIENT)
Dept: FAMILY MEDICINE | Age: 68
End: 2023-07-28

## 2023-08-01 ENCOUNTER — APPOINTMENT (OUTPATIENT)
Dept: FAMILY MEDICINE | Age: 68
End: 2023-08-01

## 2023-08-14 DIAGNOSIS — F41.1 GAD (GENERALIZED ANXIETY DISORDER): ICD-10-CM

## 2023-08-14 RX ORDER — ESCITALOPRAM OXALATE 10 MG/1
10 TABLET ORAL DAILY
Qty: 90 TABLET | Refills: 0 | Status: SHIPPED | OUTPATIENT
Start: 2023-08-14

## 2023-08-30 DIAGNOSIS — K21.9 GASTROESOPHAGEAL REFLUX DISEASE WITHOUT ESOPHAGITIS: ICD-10-CM

## 2023-08-30 RX ORDER — PANTOPRAZOLE SODIUM 40 MG/1
40 TABLET, DELAYED RELEASE ORAL DAILY
Qty: 30 TABLET | Refills: 1 | Status: SHIPPED | OUTPATIENT
Start: 2023-08-30

## 2023-09-13 ENCOUNTER — TELEPHONE (OUTPATIENT)
Dept: FAMILY MEDICINE | Age: 68
End: 2023-09-13

## 2023-10-03 NOTE — TELEPHONE ENCOUNTER
Diazepam 2mg refilled 10/3/22 #30/1R to requesting pharmacy.     RN, please deny as this is a duplicate request.

## 2023-10-05 RX ORDER — DIAZEPAM 2 MG/1
2 TABLET ORAL 3 TIMES DAILY PRN
Qty: 30 TABLET | Refills: 0 | OUTPATIENT
Start: 2023-10-05

## 2023-10-10 DIAGNOSIS — H81.90 PERIPHERAL VESTIBULOPATHY, UNSPECIFIED LATERALITY: Primary | ICD-10-CM

## 2023-10-10 NOTE — TELEPHONE ENCOUNTER
Medication: DIAZEPAM 2 MG Oral Tab     Date of last refill: 10/3/2022 (#30/1)  [place]     Last office visit: 1/7/2022  Due back to clinic per last office note:  4 months  Date next office visit scheduled:    No future appointments.         Last OV note recommendation:

## 2023-10-11 RX ORDER — DIAZEPAM 2 MG/1
2 TABLET ORAL 3 TIMES DAILY PRN
Qty: 30 TABLET | Refills: 0 | Status: SHIPPED | OUTPATIENT
Start: 2023-10-11

## 2023-10-17 ENCOUNTER — APPOINTMENT (OUTPATIENT)
Dept: FAMILY MEDICINE | Age: 68
End: 2023-10-17

## 2023-11-20 ENCOUNTER — E-ADVICE (OUTPATIENT)
Dept: ADMINISTRATIVE | Age: 68
End: 2023-11-20

## 2023-12-15 DIAGNOSIS — F41.1 GAD (GENERALIZED ANXIETY DISORDER): ICD-10-CM

## 2023-12-15 RX ORDER — ESCITALOPRAM OXALATE 10 MG/1
10 TABLET ORAL DAILY
Qty: 90 TABLET | Refills: 0 | Status: SHIPPED | OUTPATIENT
Start: 2023-12-15 | End: 2024-02-02 | Stop reason: SDUPTHER

## 2023-12-27 RX ORDER — CARVEDILOL 25 MG/1
25 TABLET ORAL 2 TIMES DAILY WITH MEALS
Qty: 180 TABLET | Refills: 0 | Status: SHIPPED | OUTPATIENT
Start: 2023-12-27

## 2024-01-19 ENCOUNTER — E-ADVICE (OUTPATIENT)
Dept: ADMINISTRATIVE | Age: 69
End: 2024-01-19

## 2024-02-02 ENCOUNTER — APPOINTMENT (OUTPATIENT)
Dept: FAMILY MEDICINE | Age: 69
End: 2024-02-02

## 2024-02-02 VITALS
HEART RATE: 67 BPM | BODY MASS INDEX: 26.06 KG/M2 | RESPIRATION RATE: 18 BRPM | OXYGEN SATURATION: 98 % | SYSTOLIC BLOOD PRESSURE: 138 MMHG | TEMPERATURE: 97.9 F | WEIGHT: 166 LBS | DIASTOLIC BLOOD PRESSURE: 86 MMHG | HEIGHT: 67 IN

## 2024-02-02 DIAGNOSIS — R42 CHRONIC VERTIGO: ICD-10-CM

## 2024-02-02 DIAGNOSIS — K21.9 GASTROESOPHAGEAL REFLUX DISEASE WITHOUT ESOPHAGITIS: ICD-10-CM

## 2024-02-02 DIAGNOSIS — Z12.31 VISIT FOR SCREENING MAMMOGRAM: ICD-10-CM

## 2024-02-02 DIAGNOSIS — K22.10 ULCER OF ESOPHAGUS WITHOUT BLEEDING: ICD-10-CM

## 2024-02-02 DIAGNOSIS — Z12.11 COLON CANCER SCREENING: ICD-10-CM

## 2024-02-02 DIAGNOSIS — E78.49 OTHER HYPERLIPIDEMIA: ICD-10-CM

## 2024-02-02 DIAGNOSIS — F41.1 GAD (GENERALIZED ANXIETY DISORDER): ICD-10-CM

## 2024-02-02 DIAGNOSIS — Z00.00 MEDICARE ANNUAL WELLNESS VISIT, SUBSEQUENT: Primary | ICD-10-CM

## 2024-02-02 DIAGNOSIS — I10 PRIMARY HYPERTENSION: ICD-10-CM

## 2024-02-02 LAB
ALBUMIN SERPL-MCNC: 3.6 G/DL (ref 3.6–5.1)
ALBUMIN/GLOB SERPL: 0.9 {RATIO} (ref 1–2.4)
ALP SERPL-CCNC: 110 UNITS/L (ref 45–117)
ALT SERPL-CCNC: 31 UNITS/L
ANION GAP SERPL CALC-SCNC: 14 MMOL/L (ref 7–19)
AST SERPL-CCNC: 36 UNITS/L
BASOPHILS # BLD: 0.1 K/MCL (ref 0–0.3)
BASOPHILS NFR BLD: 1 %
BILIRUB SERPL-MCNC: 1.1 MG/DL (ref 0.2–1)
BUN SERPL-MCNC: 18 MG/DL (ref 6–20)
BUN/CREAT SERPL: 21 (ref 7–25)
CALCIUM SERPL-MCNC: 9.3 MG/DL (ref 8.4–10.2)
CHLORIDE SERPL-SCNC: 101 MMOL/L (ref 97–110)
CHOLEST SERPL-MCNC: 318 MG/DL
CHOLEST/HDLC SERPL: 4.3 {RATIO}
CO2 SERPL-SCNC: 29 MMOL/L (ref 21–32)
CREAT SERPL-MCNC: 0.86 MG/DL (ref 0.51–0.95)
DEPRECATED RDW RBC: 41 FL (ref 39–50)
EGFRCR SERPLBLD CKD-EPI 2021: 73 ML/MIN/{1.73_M2}
EOSINOPHIL # BLD: 0.2 K/MCL (ref 0–0.5)
EOSINOPHIL NFR BLD: 3 %
ERYTHROCYTE [DISTWIDTH] IN BLOOD: 11.8 % (ref 11–15)
FASTING DURATION TIME PATIENT: ABNORMAL H
GLOBULIN SER-MCNC: 4.1 G/DL (ref 2–4)
GLUCOSE SERPL-MCNC: 116 MG/DL (ref 70–99)
HBA1C MFR BLD: 5.3 % (ref 4.5–5.6)
HCT VFR BLD CALC: 45.2 % (ref 36–46.5)
HDLC SERPL-MCNC: 74 MG/DL
HGB BLD-MCNC: 14.8 G/DL (ref 12–15.5)
IMM GRANULOCYTES # BLD AUTO: 0 K/MCL (ref 0–0.2)
IMM GRANULOCYTES # BLD: 0 %
LDLC SERPL CALC-MCNC: 201 MG/DL
LYMPHOCYTES # BLD: 1.9 K/MCL (ref 1–4)
LYMPHOCYTES NFR BLD: 24 %
MCH RBC QN AUTO: 31.4 PG (ref 26–34)
MCHC RBC AUTO-ENTMCNC: 32.7 G/DL (ref 32–36.5)
MCV RBC AUTO: 95.8 FL (ref 78–100)
MONOCYTES # BLD: 0.7 K/MCL (ref 0.3–0.9)
MONOCYTES NFR BLD: 9 %
NEUTROPHILS # BLD: 4.9 K/MCL (ref 1.8–7.7)
NEUTROPHILS NFR BLD: 63 %
NONHDLC SERPL-MCNC: 244 MG/DL
NRBC BLD MANUAL-RTO: 0 /100 WBC
PLATELET # BLD AUTO: 249 K/MCL (ref 140–450)
POTASSIUM SERPL-SCNC: 4.5 MMOL/L (ref 3.4–5.1)
PROT SERPL-MCNC: 7.7 G/DL (ref 6.4–8.2)
RBC # BLD: 4.72 MIL/MCL (ref 4–5.2)
SODIUM SERPL-SCNC: 139 MMOL/L (ref 135–145)
TRIGL SERPL-MCNC: 216 MG/DL
TSH SERPL-ACNC: 3.09 MCUNITS/ML (ref 0.35–5)
WBC # BLD: 7.7 K/MCL (ref 4.2–11)

## 2024-02-02 PROCEDURE — 3079F DIAST BP 80-89 MM HG: CPT | Performed by: FAMILY MEDICINE

## 2024-02-02 PROCEDURE — 99214 OFFICE O/P EST MOD 30 MIN: CPT | Performed by: FAMILY MEDICINE

## 2024-02-02 PROCEDURE — G0439 PPPS, SUBSEQ VISIT: HCPCS | Performed by: FAMILY MEDICINE

## 2024-02-02 PROCEDURE — 3075F SYST BP GE 130 - 139MM HG: CPT | Performed by: FAMILY MEDICINE

## 2024-02-02 RX ORDER — PANTOPRAZOLE SODIUM 40 MG/1
40 TABLET, DELAYED RELEASE ORAL DAILY
Qty: 30 TABLET | Refills: 1 | Status: SHIPPED | OUTPATIENT
Start: 2024-02-02

## 2024-02-02 RX ORDER — DIAZEPAM 2 MG/1
2 TABLET ORAL NIGHTLY PRN
Qty: 15 TABLET | Refills: 0 | Status: SHIPPED | OUTPATIENT
Start: 2024-02-02

## 2024-02-02 RX ORDER — NICOTINE 14MG/24HR
PATCH, TRANSDERMAL 24 HOURS TRANSDERMAL
COMMUNITY

## 2024-02-02 RX ORDER — ESCITALOPRAM OXALATE 10 MG/1
10 TABLET ORAL DAILY
Qty: 90 TABLET | Refills: 1 | Status: SHIPPED | OUTPATIENT
Start: 2024-02-02

## 2024-02-02 RX ORDER — ATORVASTATIN CALCIUM 40 MG/1
40 TABLET, FILM COATED ORAL NIGHTLY
Qty: 90 TABLET | Refills: 1 | Status: SHIPPED | OUTPATIENT
Start: 2024-02-02 | End: 2024-02-05 | Stop reason: DRUGHIGH

## 2024-02-02 RX ORDER — AMLODIPINE BESYLATE 5 MG/1
5 TABLET ORAL DAILY
Qty: 30 TABLET | Refills: 2 | Status: CANCELLED | OUTPATIENT
Start: 2024-02-02

## 2024-02-02 RX ORDER — ASPIRIN 81 MG/1
81 TABLET ORAL DAILY
COMMUNITY

## 2024-02-02 RX ORDER — MECLIZINE HYDROCHLORIDE 25 MG/1
25 TABLET ORAL 3 TIMES DAILY PRN
COMMUNITY

## 2024-02-02 ASSESSMENT — PATIENT HEALTH QUESTIONNAIRE - PHQ9
2. FEELING DOWN, DEPRESSED OR HOPELESS: NOT AT ALL
SUM OF ALL RESPONSES TO PHQ9 QUESTIONS 1 AND 2: 0
CLINICAL INTERPRETATION OF PHQ2 SCORE: NO FURTHER SCREENING NEEDED
SUM OF ALL RESPONSES TO PHQ9 QUESTIONS 1 AND 2: 0
1. LITTLE INTEREST OR PLEASURE IN DOING THINGS: NOT AT ALL

## 2024-02-05 RX ORDER — ATORVASTATIN CALCIUM 80 MG/1
80 TABLET, FILM COATED ORAL DAILY
Qty: 90 TABLET | Refills: 3 | Status: SHIPPED | OUTPATIENT
Start: 2024-02-05

## 2024-02-13 ENCOUNTER — TELEPHONE (OUTPATIENT)
Dept: GASTROENTEROLOGY | Age: 69
End: 2024-02-13

## 2024-05-17 LAB
*MEAN CORPUSCULAR HGB CONC: 33.9 G/DL (ref 32.5–35.8)
A/G RATIO: 1.31 (ref 1.1–2.4)
ALANINE AMINOTRANSFE: 49 U/L (ref 7–52)
ALBUMIN, SERUM (ALB): 4.6 G/DL (ref 3.5–5.7)
ALKALINE PHOSPHATASE (ALK): 83 U/L (ref 34–104)
ANION GAP: 13 MEQ/L (ref 6–15)
APPEARANCE: ABNORMAL
ASCORBIC ACID COMMENT, URINE: NORMAL
ASPARTATE AMINOTRANS: 78 U/L (ref 13–39)
BACTERIA: ABNORMAL /HPF
BASOPHIL AUTOMATED: 0.9 %
BASOPHILS: 0.1 (ref 0–0.14)
BILIRUBIN, TOTAL: 0.7 MG/DL (ref 0.3–1)
BILIRUBIN: ABNORMAL
BLOOD UREA NITROGEN (BUN): 24 MG/DL (ref 7–25)
BUN/CREATININE RATIO: 34.3 (ref 7.4–23)
CALCIUM, SERUM: 9.8 MG/DL (ref 8.6–10.3)
CARBON DIOXIDE: 23 MEQ/L (ref 21–31)
CHLORIDE, SERUM: 101 MMOL/L (ref 98–107)
COLOR: YELLOW
CREATININE: 0.7 MG/DL (ref 0.6–1.2)
EOSINOPHIL AUTOMATED: 0.3 %
EOSINOPHILS: 0 (ref 0–0.6)
EST GLOMERULAR FILTRATION RATE: > 60 ML/MIN
GLUCOSE, URINE, AUTOMATED: ABNORMAL MG/DL
GLUCOSE: 144 MG/DL (ref 70–99)
HEMATOCRIT: 42.7 % (ref 34.7–45.1)
HEMOGLOBIN: 14.5 GM/DL (ref 12–15.3)
K (POTASSIUM, SERUM): 3.9 MMOL/L (ref 3.5–5.2)
KETONES, URINE, AUTOMATED: 80 MG/DL
LEUKOCYTE, URINE, AUTOMATED: ABNORMAL
LYMPHOCYTE AUTOMATED: 15.1 %
LYMPHOCYTES: 1.6 (ref 0.78–3.73)
MEAN CORPUSCULAR HGB: 32 PG (ref 26–34)
MEAN CORPUSCULAR VOL: 94.5 FL (ref 80–100)
MEAN PLATELET VOLUME: 7.9 FL (ref 6.8–10.2)
MONOCYTE AUTOMATED: 5.6 %
MONOCYTES: 0.6 (ref 0.17–1)
MUCUS: ABNORMAL /LPF
NA (SODIUM, SERUM): 137 MMOL/L (ref 133–144)
NEUTROPHIL ABSOLUTE: 8.5 (ref 1.91–7.6)
NEUTROPHIL AUTOMATED: 78.1 %
NITRITE, URINE AUTOMATED: NEGATIVE
PH, URINE: 6 (ref 5–8)
PLATELET COUNT: 285 K/MM3 (ref 150–450)
PROTEIN TOTAL: 8.1 G/DL (ref 6.4–8.9)
PROTEIN, URINE: 30 MG/DL
RBC: ABNORMAL /HPF (ref 0–2)
RED BLOOD CELL COUNT: 4.52 M/MM3 (ref 3.63–5.04)
RED CELL DISTRIBUTIO: 12.5 % (ref 11.9–15.9)
SPECIFIC GRAVITY UA: 1.02 (ref 1–1.03)
SQUAMOUS EPITHELIAL: ABNORMAL /LPF
TROPONIN I HIGH SENSITIVITY: 13 PG/ML (ref 0–12)
TROPONIN I HIGH SENSITIVITY: 16 PG/ML (ref 0–12)
TROPONIN I HIGH SENSITIVITY: 17 PG/ML (ref 0–12)
URINE, BLOOD, AUTOMATED: ABNORMAL
UROBILINOGEN, URINE, AUTOMATED: 4 MG/DL
WBC: ABNORMAL /HPF (ref 0–5)
WHITE BLOOD CELL COU: 10.9 K/MM3 (ref 4–11)

## 2024-05-18 ENCOUNTER — EXTERNAL RECORD (OUTPATIENT)
Dept: OTHER | Age: 69
End: 2024-05-18

## 2024-05-18 ENCOUNTER — EXTERNAL RECORD (OUTPATIENT)
Dept: HEALTH INFORMATION MANAGEMENT | Facility: OTHER | Age: 69
End: 2024-05-18

## 2024-05-18 LAB
*MEAN CORPUSCULAR HGB CONC: 34 G/DL (ref 32.5–35.8)
ANION GAP: 9 MEQ/L (ref 6–15)
BASOPHIL AUTOMATED: 0.5 %
BASOPHILS: 0 (ref 0–0.14)
BLOOD UREA NITROGEN (BUN): 23 MG/DL (ref 7–25)
BUN/CREATININE RATIO: 34.3 (ref 7.4–23)
CALCIUM, SERUM: 8.9 MG/DL (ref 8.6–10.3)
CARBON DIOXIDE: 24 MEQ/L (ref 21–31)
CHLORIDE, SERUM: 106 MMOL/L (ref 98–107)
CREATININE: 0.67 MG/DL (ref 0.6–1.2)
EOSINOPHIL AUTOMATED: 0.6 %
EOSINOPHILS: 0.1 (ref 0–0.6)
EST GLOMERULAR FILTRATION RATE: > 60 ML/MIN
GLUCOSE: 91 MG/DL (ref 70–99)
HEMATOCRIT: 38.9 % (ref 34.7–45.1)
HEMOGLOBIN: 13.2 GM/DL (ref 12–15.3)
K (POTASSIUM, SERUM): 3.5 MMOL/L (ref 3.5–5.2)
LYMPHOCYTE AUTOMATED: 22.1 %
LYMPHOCYTES: 2.3 (ref 0.78–3.73)
MEAN CORPUSCULAR HGB: 32.4 PG (ref 26–34)
MEAN CORPUSCULAR VOL: 95.4 FL (ref 80–100)
MEAN PLATELET VOLUME: 7.7 FL (ref 6.8–10.2)
MONOCYTE AUTOMATED: 9 %
MONOCYTES: 1 (ref 0.17–1)
NA (SODIUM, SERUM): 139 MMOL/L (ref 133–144)
NEUTROPHIL ABSOLUTE: 7.2 (ref 1.91–7.6)
NEUTROPHIL AUTOMATED: 67.8 %
PLATELET COUNT: 236 K/MM3 (ref 150–450)
RED BLOOD CELL COUNT: 4.08 M/MM3 (ref 3.63–5.04)
RED CELL DISTRIBUTIO: 12.4 % (ref 11.9–15.9)
TROPONIN I HIGH SENSITIVITY: 37 PG/ML (ref 0–12)
TROPONIN I HIGH SENSITIVITY: 39 PG/ML (ref 0–12)
WHITE BLOOD CELL COU: 10.6 K/MM3 (ref 4–11)

## 2024-05-18 PROCEDURE — 93306 TTE W/DOPPLER COMPLETE: CPT | Performed by: INTERNAL MEDICINE

## 2024-05-20 ENCOUNTER — TELEPHONE (OUTPATIENT)
Dept: FAMILY MEDICINE | Age: 69
End: 2024-05-20

## 2024-05-20 LAB — URINE CULTURE: NORMAL

## 2024-05-21 ENCOUNTER — OFFICE VISIT (OUTPATIENT)
Dept: FAMILY MEDICINE | Age: 69
End: 2024-05-21

## 2024-05-21 VITALS
WEIGHT: 166.8 LBS | HEART RATE: 64 BPM | TEMPERATURE: 95.7 F | OXYGEN SATURATION: 100 % | RESPIRATION RATE: 18 BRPM | SYSTOLIC BLOOD PRESSURE: 139 MMHG | BODY MASS INDEX: 26.18 KG/M2 | HEIGHT: 67 IN | DIASTOLIC BLOOD PRESSURE: 75 MMHG

## 2024-05-21 DIAGNOSIS — K21.9 GASTROESOPHAGEAL REFLUX DISEASE WITHOUT ESOPHAGITIS: ICD-10-CM

## 2024-05-21 DIAGNOSIS — I10 PRIMARY HYPERTENSION: ICD-10-CM

## 2024-05-21 DIAGNOSIS — Z12.11 COLON CANCER SCREENING: ICD-10-CM

## 2024-05-21 DIAGNOSIS — R11.0 CHRONIC NAUSEA: Primary | ICD-10-CM

## 2024-05-21 RX ORDER — PANTOPRAZOLE SODIUM 40 MG/1
40 TABLET, DELAYED RELEASE ORAL DAILY
Qty: 90 TABLET | Refills: 1 | Status: SHIPPED | OUTPATIENT
Start: 2024-05-21

## 2024-05-21 ASSESSMENT — PATIENT HEALTH QUESTIONNAIRE - PHQ9
SUM OF ALL RESPONSES TO PHQ9 QUESTIONS 1 AND 2: 0
2. FEELING DOWN, DEPRESSED OR HOPELESS: NOT AT ALL
CLINICAL INTERPRETATION OF PHQ2 SCORE: NO FURTHER SCREENING NEEDED
1. LITTLE INTEREST OR PLEASURE IN DOING THINGS: NOT AT ALL
SUM OF ALL RESPONSES TO PHQ9 QUESTIONS 1 AND 2: 0

## 2024-05-24 LAB
BEDSIDE GLUCOSE: 74 MG/DL (ref 70–110)
BEDSIDE GLUCOSE: 77 MG/DL (ref 70–110)
BEDSIDE GLUCOSE: 80 MG/DL (ref 70–110)
BEDSIDE GLUCOSE: 85 MG/DL (ref 70–110)

## 2024-05-25 LAB — BEDSIDE GLUCOSE: 87 MG/DL (ref 70–110)

## 2024-05-31 PROCEDURE — 93010 ELECTROCARDIOGRAM REPORT: CPT | Performed by: INTERNAL MEDICINE

## 2024-06-10 ENCOUNTER — TELEPHONE (OUTPATIENT)
Dept: GASTROENTEROLOGY | Age: 69
End: 2024-06-10

## 2024-06-12 ENCOUNTER — APPOINTMENT (OUTPATIENT)
Dept: ULTRASOUND IMAGING | Age: 69
End: 2024-06-12
Attending: FAMILY MEDICINE

## 2024-06-12 DIAGNOSIS — R11.0 CHRONIC NAUSEA: ICD-10-CM

## 2024-06-12 PROCEDURE — 76705 ECHO EXAM OF ABDOMEN: CPT | Performed by: STUDENT IN AN ORGANIZED HEALTH CARE EDUCATION/TRAINING PROGRAM

## 2024-06-14 ENCOUNTER — E-ADVICE (OUTPATIENT)
Dept: GASTROENTEROLOGY | Age: 69
End: 2024-06-14

## 2024-06-18 DIAGNOSIS — K76.0 FATTY LIVER: Primary | ICD-10-CM

## 2024-06-18 DIAGNOSIS — R93.2 ABNORMAL ULTRASOUND OF GALLBLADDER: ICD-10-CM

## 2024-06-27 ENCOUNTER — TELEPHONE (OUTPATIENT)
Dept: SURGERY | Age: 69
End: 2024-06-27

## 2024-07-01 RX ORDER — CARVEDILOL 25 MG/1
25 TABLET ORAL 2 TIMES DAILY WITH MEALS
Qty: 180 TABLET | Refills: 0 | Status: SHIPPED | OUTPATIENT
Start: 2024-07-01

## 2024-08-09 ENCOUNTER — E-ADVICE (OUTPATIENT)
Dept: FAMILY MEDICINE | Age: 69
End: 2024-08-09

## 2024-08-09 ENCOUNTER — APPOINTMENT (OUTPATIENT)
Dept: MAMMOGRAPHY | Age: 69
End: 2024-08-09
Attending: FAMILY MEDICINE

## 2024-08-09 DIAGNOSIS — Z12.31 VISIT FOR SCREENING MAMMOGRAM: ICD-10-CM

## 2024-08-09 PROCEDURE — 77067 SCR MAMMO BI INCL CAD: CPT | Performed by: RADIOLOGY

## 2024-08-09 PROCEDURE — 77063 BREAST TOMOSYNTHESIS BI: CPT | Performed by: RADIOLOGY

## 2024-08-12 ENCOUNTER — OFFICE VISIT (OUTPATIENT)
Dept: FAMILY MEDICINE | Age: 69
End: 2024-08-12

## 2024-08-12 ENCOUNTER — TELEPHONE (OUTPATIENT)
Dept: FAMILY MEDICINE | Age: 69
End: 2024-08-12

## 2024-08-12 ENCOUNTER — APPOINTMENT (OUTPATIENT)
Dept: FAMILY MEDICINE | Age: 69
End: 2024-08-12

## 2024-08-12 VITALS
DIASTOLIC BLOOD PRESSURE: 72 MMHG | BODY MASS INDEX: 26.53 KG/M2 | WEIGHT: 169 LBS | TEMPERATURE: 97.3 F | HEART RATE: 69 BPM | SYSTOLIC BLOOD PRESSURE: 114 MMHG | RESPIRATION RATE: 18 BRPM | HEIGHT: 67 IN | OXYGEN SATURATION: 100 %

## 2024-08-12 DIAGNOSIS — E78.49 OTHER HYPERLIPIDEMIA: Primary | ICD-10-CM

## 2024-08-12 DIAGNOSIS — R92.30 DENSE BREASTS: Primary | ICD-10-CM

## 2024-08-12 DIAGNOSIS — N64.89 OTHER SPECIFIED DISORDERS OF BREAST: ICD-10-CM

## 2024-08-12 DIAGNOSIS — R92.30 DENSE BREASTS: ICD-10-CM

## 2024-08-12 DIAGNOSIS — F41.1 GAD (GENERALIZED ANXIETY DISORDER): ICD-10-CM

## 2024-08-12 LAB
ALBUMIN SERPL-MCNC: 3.6 G/DL (ref 3.6–5.1)
ALBUMIN/GLOB SERPL: 1.1 {RATIO} (ref 1–2.4)
ALP SERPL-CCNC: 131 UNITS/L (ref 45–117)
ALT SERPL-CCNC: 33 UNITS/L
ANION GAP SERPL CALC-SCNC: 14 MMOL/L (ref 7–19)
AST SERPL-CCNC: 38 UNITS/L
BILIRUB SERPL-MCNC: 0.5 MG/DL (ref 0.2–1)
BUN SERPL-MCNC: 17 MG/DL (ref 6–20)
BUN/CREAT SERPL: 23 (ref 7–25)
CALCIUM SERPL-MCNC: 8.4 MG/DL (ref 8.4–10.2)
CHLORIDE SERPL-SCNC: 102 MMOL/L (ref 97–110)
CHOLEST SERPL-MCNC: 196 MG/DL
CHOLEST/HDLC SERPL: 2.6 {RATIO}
CO2 SERPL-SCNC: 27 MMOL/L (ref 21–32)
CREAT SERPL-MCNC: 0.75 MG/DL (ref 0.51–0.95)
EGFRCR SERPLBLD CKD-EPI 2021: 86 ML/MIN/{1.73_M2}
FASTING DURATION TIME PATIENT: ABNORMAL H
GLOBULIN SER-MCNC: 3.3 G/DL (ref 2–4)
GLUCOSE SERPL-MCNC: 118 MG/DL (ref 70–99)
HDLC SERPL-MCNC: 75 MG/DL
LDLC SERPL CALC-MCNC: 81 MG/DL
NONHDLC SERPL-MCNC: 121 MG/DL
POTASSIUM SERPL-SCNC: 4.6 MMOL/L (ref 3.4–5.1)
PROT SERPL-MCNC: 6.9 G/DL (ref 6.4–8.2)
SODIUM SERPL-SCNC: 138 MMOL/L (ref 135–145)
TRIGL SERPL-MCNC: 199 MG/DL

## 2024-08-12 PROCEDURE — 99214 OFFICE O/P EST MOD 30 MIN: CPT | Performed by: FAMILY MEDICINE

## 2024-08-12 PROCEDURE — 3078F DIAST BP <80 MM HG: CPT | Performed by: FAMILY MEDICINE

## 2024-08-12 PROCEDURE — 3074F SYST BP LT 130 MM HG: CPT | Performed by: FAMILY MEDICINE

## 2024-08-12 RX ORDER — ESCITALOPRAM OXALATE 10 MG/1
10 TABLET ORAL DAILY
Qty: 90 TABLET | Refills: 1 | Status: SHIPPED | OUTPATIENT
Start: 2024-08-12

## 2024-08-12 ASSESSMENT — COGNITIVE AND FUNCTIONAL STATUS - GENERAL
DO YOU HAVE DIFFICULTY DRESSING OR BATHING: NO
BECAUSE OF A PHYSICAL, MENTAL, OR EMOTIONAL CONDITION, DO YOU HAVE SERIOUS DIFFICULTY CONCENTRATING, REMEMBERING OR MAKING DECISIONS: NO
BECAUSE OF A PHYSICAL, MENTAL, OR EMOTIONAL CONDITION, DO YOU HAVE DIFFICULTY DOING ERRANDS ALONE: NO
DO YOU HAVE SERIOUS DIFFICULTY WALKING OR CLIMBING STAIRS: NO

## 2024-08-12 ASSESSMENT — PATIENT HEALTH QUESTIONNAIRE - PHQ9
CLINICAL INTERPRETATION OF PHQ2 SCORE: NO FURTHER SCREENING NEEDED
SUM OF ALL RESPONSES TO PHQ9 QUESTIONS 1 AND 2: 0
1. LITTLE INTEREST OR PLEASURE IN DOING THINGS: NOT AT ALL
2. FEELING DOWN, DEPRESSED OR HOPELESS: NOT AT ALL
SUM OF ALL RESPONSES TO PHQ9 QUESTIONS 1 AND 2: 0

## 2024-08-16 ENCOUNTER — IMAGING SERVICES (OUTPATIENT)
Dept: ULTRASOUND IMAGING | Age: 69
End: 2024-08-16
Attending: FAMILY MEDICINE

## 2024-08-16 DIAGNOSIS — N64.89 OTHER SPECIFIED DISORDERS OF BREAST: ICD-10-CM

## 2024-08-16 DIAGNOSIS — R92.8 ABNORMAL MAMMOGRAM: Primary | ICD-10-CM

## 2024-08-16 DIAGNOSIS — R92.30 DENSE BREASTS: ICD-10-CM

## 2024-08-16 PROCEDURE — 76641 ULTRASOUND BREAST COMPLETE: CPT | Performed by: RADIOLOGY

## 2024-08-23 ENCOUNTER — TELEPHONE (OUTPATIENT)
Dept: CT IMAGING | Age: 69
End: 2024-08-23

## 2024-09-09 ENCOUNTER — APPOINTMENT (OUTPATIENT)
Dept: GASTROENTEROLOGY | Age: 69
End: 2024-09-09
Attending: FAMILY MEDICINE

## 2024-09-09 VITALS — WEIGHT: 160 LBS | HEIGHT: 67 IN | BODY MASS INDEX: 25.11 KG/M2

## 2024-09-09 DIAGNOSIS — R79.89 ELEVATED LFTS: ICD-10-CM

## 2024-09-09 DIAGNOSIS — R13.19 INTERMITTENT DYSPHAGIA: Primary | ICD-10-CM

## 2024-09-09 DIAGNOSIS — Z86.010 PERSONAL HISTORY OF COLONIC POLYPS: ICD-10-CM

## 2024-09-09 PROCEDURE — 99204 OFFICE O/P NEW MOD 45 MIN: CPT | Performed by: INTERNAL MEDICINE

## 2024-09-26 RX ORDER — CARVEDILOL 25 MG/1
TABLET ORAL
Qty: 180 TABLET | Refills: 3 | Status: SHIPPED | OUTPATIENT
Start: 2024-09-26

## 2024-10-04 ENCOUNTER — APPOINTMENT (OUTPATIENT)
Dept: CARDIOLOGY | Age: 69
End: 2024-10-04

## 2024-10-04 VITALS
BODY MASS INDEX: 26.68 KG/M2 | DIASTOLIC BLOOD PRESSURE: 74 MMHG | HEIGHT: 66 IN | WEIGHT: 166 LBS | RESPIRATION RATE: 16 BRPM | HEART RATE: 73 BPM | OXYGEN SATURATION: 97 % | SYSTOLIC BLOOD PRESSURE: 152 MMHG

## 2024-10-04 DIAGNOSIS — E78.49 OTHER HYPERLIPIDEMIA: ICD-10-CM

## 2024-10-04 DIAGNOSIS — I25.10 MILD CAD: ICD-10-CM

## 2024-10-04 DIAGNOSIS — I10 PRIMARY HYPERTENSION: Primary | ICD-10-CM

## 2024-10-04 DIAGNOSIS — I65.23 BILATERAL CAROTID ARTERY STENOSIS: ICD-10-CM

## 2024-10-04 RX ORDER — LOSARTAN POTASSIUM 25 MG/1
25 TABLET ORAL DAILY
Qty: 30 TABLET | Refills: 5 | Status: SHIPPED | OUTPATIENT
Start: 2024-10-04

## 2024-10-04 ASSESSMENT — ENCOUNTER SYMPTOMS
LIGHT-HEADEDNESS: 1
ANAL BLEEDING: 0
DIAPHORESIS: 0
CHEST TIGHTNESS: 0
SHORTNESS OF BREATH: 0
FATIGUE: 0
HEADACHES: 0
ABDOMINAL PAIN: 0
DIZZINESS: 0
UNEXPECTED WEIGHT CHANGE: 0

## 2024-11-10 DIAGNOSIS — R11.0 CHRONIC NAUSEA: ICD-10-CM

## 2024-11-10 DIAGNOSIS — K21.9 GASTROESOPHAGEAL REFLUX DISEASE WITHOUT ESOPHAGITIS: ICD-10-CM

## 2024-11-11 RX ORDER — PANTOPRAZOLE SODIUM 40 MG/1
40 TABLET, DELAYED RELEASE ORAL DAILY
Qty: 90 TABLET | Refills: 1 | Status: SHIPPED | OUTPATIENT
Start: 2024-11-11

## 2025-01-06 ENCOUNTER — TELEPHONE (OUTPATIENT)
Dept: GASTROENTEROLOGY | Age: 70
End: 2025-01-06

## 2025-01-06 DIAGNOSIS — R13.19 INTERMITTENT DYSPHAGIA: ICD-10-CM

## 2025-01-06 DIAGNOSIS — Z86.0100 HISTORY OF COLONIC POLYPS: Primary | ICD-10-CM

## 2025-01-07 ENCOUNTER — E-ADVICE (OUTPATIENT)
Dept: GASTROENTEROLOGY | Age: 70
End: 2025-01-07

## 2025-01-09 ENCOUNTER — HOSPITAL ENCOUNTER (OUTPATIENT)
Dept: GASTROENTEROLOGY | Age: 70
End: 2025-01-09
Attending: INTERNAL MEDICINE

## 2025-01-20 RX ORDER — ATORVASTATIN CALCIUM 80 MG/1
80 TABLET, FILM COATED ORAL DAILY
Qty: 90 TABLET | Refills: 0 | Status: SHIPPED | OUTPATIENT
Start: 2025-01-20

## 2025-02-05 SDOH — ECONOMIC STABILITY: TRANSPORTATION INSECURITY
IN THE PAST 12 MONTHS, HAS LACK OF RELIABLE TRANSPORTATION KEPT YOU FROM MEDICAL APPOINTMENTS, MEETINGS, WORK OR FROM GETTING THINGS NEEDED FOR DAILY LIVING?: NO

## 2025-02-05 SDOH — ECONOMIC STABILITY: FOOD INSECURITY: WITHIN THE PAST 12 MONTHS, THE FOOD YOU BOUGHT JUST DIDN'T LAST AND YOU DIDN'T HAVE MONEY TO GET MORE.: NEVER TRUE

## 2025-02-05 SDOH — ECONOMIC STABILITY: HOUSING INSECURITY: WHAT IS YOUR LIVING SITUATION TODAY?: I HAVE A STEADY PLACE TO LIVE

## 2025-02-05 SDOH — ECONOMIC STABILITY: HOUSING INSECURITY: DO YOU HAVE PROBLEMS WITH ANY OF THE FOLLOWING?: NONE OF THE ABOVE

## 2025-02-05 SDOH — ECONOMIC STABILITY: GENERAL: WOULD YOU LIKE HELP WITH ANY OF THE FOLLOWING NEEDS?: I DON'T WANT HELP WITH ANY OF THESE

## 2025-02-05 ASSESSMENT — PATIENT HEALTH QUESTIONNAIRE - PHQ9
2. FEELING DOWN, DEPRESSED OR HOPELESS: NOT AT ALL
CLINICAL INTERPRETATION OF PHQ2 SCORE: 0
1. LITTLE INTEREST OR PLEASURE IN DOING THINGS: NOT AT ALL
CLINICAL INTERPRETATION OF PHQ2 SCORE: NO FURTHER SCREENING NEEDED
SUM OF ALL RESPONSES TO PHQ9 QUESTIONS 1 AND 2: 0

## 2025-02-05 ASSESSMENT — SOCIAL DETERMINANTS OF HEALTH (SDOH): IN THE PAST 12 MONTHS, HAS THE ELECTRIC, GAS, OIL, OR WATER COMPANY THREATENED TO SHUT OFF SERVICE IN YOUR HOME?: NO

## 2025-02-07 ENCOUNTER — APPOINTMENT (OUTPATIENT)
Dept: CARDIOLOGY | Age: 70
End: 2025-02-07

## 2025-02-07 VITALS
WEIGHT: 164 LBS | OXYGEN SATURATION: 98 % | BODY MASS INDEX: 25.74 KG/M2 | HEIGHT: 67 IN | HEART RATE: 70 BPM | DIASTOLIC BLOOD PRESSURE: 74 MMHG | SYSTOLIC BLOOD PRESSURE: 158 MMHG | RESPIRATION RATE: 16 BRPM

## 2025-02-07 DIAGNOSIS — I65.23 BILATERAL CAROTID ARTERY STENOSIS: ICD-10-CM

## 2025-02-07 DIAGNOSIS — I10 PRIMARY HYPERTENSION: ICD-10-CM

## 2025-02-07 DIAGNOSIS — I25.10 MILD CAD: Primary | ICD-10-CM

## 2025-02-07 DIAGNOSIS — E78.49 OTHER HYPERLIPIDEMIA: ICD-10-CM

## 2025-02-07 RX ORDER — LOSARTAN POTASSIUM 50 MG/1
50 TABLET ORAL DAILY
Qty: 90 TABLET | Refills: 1 | Status: SHIPPED | OUTPATIENT
Start: 2025-02-07

## 2025-02-07 ASSESSMENT — ENCOUNTER SYMPTOMS
DIZZINESS: 0
ANAL BLEEDING: 0
ABDOMINAL PAIN: 0
UNEXPECTED WEIGHT CHANGE: 0
SHORTNESS OF BREATH: 0
DIAPHORESIS: 0
LIGHT-HEADEDNESS: 1
HEADACHES: 0
CHEST TIGHTNESS: 0
FATIGUE: 0

## 2025-02-11 ENCOUNTER — APPOINTMENT (OUTPATIENT)
Dept: FAMILY MEDICINE | Age: 70
End: 2025-02-11

## 2025-02-11 ENCOUNTER — TELEPHONE (OUTPATIENT)
Dept: FAMILY MEDICINE | Age: 70
End: 2025-02-11

## 2025-02-11 VITALS
TEMPERATURE: 97.2 F | HEART RATE: 71 BPM | HEIGHT: 67 IN | RESPIRATION RATE: 18 BRPM | SYSTOLIC BLOOD PRESSURE: 132 MMHG | BODY MASS INDEX: 26.02 KG/M2 | OXYGEN SATURATION: 99 % | WEIGHT: 165.8 LBS | DIASTOLIC BLOOD PRESSURE: 80 MMHG

## 2025-02-11 DIAGNOSIS — F41.1 GAD (GENERALIZED ANXIETY DISORDER): ICD-10-CM

## 2025-02-11 DIAGNOSIS — Z00.00 MEDICARE ANNUAL WELLNESS VISIT, SUBSEQUENT: Primary | ICD-10-CM

## 2025-02-11 DIAGNOSIS — E78.49 OTHER HYPERLIPIDEMIA: ICD-10-CM

## 2025-02-11 DIAGNOSIS — R73.01 IMPAIRED FASTING GLUCOSE: ICD-10-CM

## 2025-02-11 DIAGNOSIS — R42 CHRONIC VERTIGO: ICD-10-CM

## 2025-02-11 DIAGNOSIS — G57.93 NEUROPATHY OF BOTH FEET: ICD-10-CM

## 2025-02-11 DIAGNOSIS — I10 PRIMARY HYPERTENSION: ICD-10-CM

## 2025-02-11 DIAGNOSIS — K21.9 GASTROESOPHAGEAL REFLUX DISEASE WITHOUT ESOPHAGITIS: ICD-10-CM

## 2025-02-11 DIAGNOSIS — K76.0 FATTY LIVER: ICD-10-CM

## 2025-02-11 RX ORDER — DIAZEPAM 2 MG/1
2 TABLET ORAL NIGHTLY PRN
Qty: 15 TABLET | Refills: 0 | Status: SHIPPED | OUTPATIENT
Start: 2025-02-11

## 2025-02-11 RX ORDER — AMITRIPTYLINE HYDROCHLORIDE 10 MG/1
10 TABLET ORAL NIGHTLY
Qty: 30 TABLET | Refills: 5 | Status: SHIPPED | OUTPATIENT
Start: 2025-02-11

## 2025-02-13 ENCOUNTER — ANESTHESIA (OUTPATIENT)
Dept: GASTROENTEROLOGY | Age: 70
End: 2025-02-13

## 2025-02-13 ENCOUNTER — ANESTHESIA EVENT (OUTPATIENT)
Dept: GASTROENTEROLOGY | Age: 70
End: 2025-02-13

## 2025-02-13 ENCOUNTER — HOSPITAL ENCOUNTER (OUTPATIENT)
Dept: GASTROENTEROLOGY | Age: 70
Discharge: HOME | End: 2025-02-13
Attending: INTERNAL MEDICINE

## 2025-02-13 VITALS
BODY MASS INDEX: 26.12 KG/M2 | OXYGEN SATURATION: 97 % | WEIGHT: 166.45 LBS | HEIGHT: 67 IN | TEMPERATURE: 97.7 F | HEART RATE: 75 BPM | DIASTOLIC BLOOD PRESSURE: 68 MMHG | SYSTOLIC BLOOD PRESSURE: 133 MMHG | RESPIRATION RATE: 17 BRPM

## 2025-02-13 DIAGNOSIS — K63.5 POLYP OF COLON, UNSPECIFIED PART OF COLON, UNSPECIFIED TYPE: ICD-10-CM

## 2025-02-13 DIAGNOSIS — Z86.0100 HISTORY OF COLONIC POLYPS: ICD-10-CM

## 2025-02-13 DIAGNOSIS — K22.2 ESOPHAGEAL RING: Primary | ICD-10-CM

## 2025-02-13 DIAGNOSIS — R13.19 INTERMITTENT DYSPHAGIA: ICD-10-CM

## 2025-02-13 LAB — COLONOSCOPY STUDY: ABNORMAL

## 2025-02-13 PROCEDURE — C1726 CATH, BAL DIL, NON-VASCULAR: HCPCS

## 2025-02-13 PROCEDURE — 10002807 HB RX 258: Performed by: INTERNAL MEDICINE

## 2025-02-13 PROCEDURE — 13000029 HB GI MAJOR COMPLEX CASE EA ADD MINUTE

## 2025-02-13 PROCEDURE — 13000001 HB PHASE II RECOVERY EA 30 MINUTES

## 2025-02-13 PROCEDURE — 13000028 HB GI MAJOR COMPLEX CASE S/U + 1ST 15 MIN

## 2025-02-13 PROCEDURE — 10006023 HB SUPPLY 272

## 2025-02-13 PROCEDURE — 10004451 HB PACU RECOVERY 1ST 30 MINUTES

## 2025-02-13 PROCEDURE — 10002800 HB RX 250 W HCPCS

## 2025-02-13 PROCEDURE — C1889 IMPLANT/INSERT DEVICE, NOC: HCPCS

## 2025-02-13 PROCEDURE — 10002801 HB RX 250 W/O HCPCS

## 2025-02-13 PROCEDURE — 13000008 HB ANESTHESIA MAC OUTSIDE OR

## 2025-02-13 RX ORDER — PROPOFOL 10 MG/ML
INJECTION, EMULSION INTRAVENOUS PRN
Status: DISCONTINUED | OUTPATIENT
Start: 2025-02-13 | End: 2025-02-13

## 2025-02-13 RX ORDER — ONDANSETRON 2 MG/ML
INJECTION INTRAMUSCULAR; INTRAVENOUS PRN
Status: DISCONTINUED | OUTPATIENT
Start: 2025-02-13 | End: 2025-02-13

## 2025-02-13 RX ORDER — DEXAMETHASONE SODIUM PHOSPHATE 4 MG/ML
INJECTION, SOLUTION INTRA-ARTICULAR; INTRALESIONAL; INTRAMUSCULAR; INTRAVENOUS; SOFT TISSUE PRN
Status: DISCONTINUED | OUTPATIENT
Start: 2025-02-13 | End: 2025-02-13

## 2025-02-13 RX ORDER — LIDOCAINE HYDROCHLORIDE 20 MG/ML
INJECTION, SOLUTION INFILTRATION; PERINEURAL PRN
Status: DISCONTINUED | OUTPATIENT
Start: 2025-02-13 | End: 2025-02-13

## 2025-02-13 RX ORDER — MIDAZOLAM HYDROCHLORIDE 1 MG/ML
INJECTION, SOLUTION INTRAMUSCULAR; INTRAVENOUS PRN
Status: DISCONTINUED | OUTPATIENT
Start: 2025-02-13 | End: 2025-02-13

## 2025-02-13 RX ORDER — SODIUM CHLORIDE 9 MG/ML
INJECTION, SOLUTION INTRAVENOUS CONTINUOUS
Status: DISCONTINUED | OUTPATIENT
Start: 2025-02-13 | End: 2025-02-15 | Stop reason: HOSPADM

## 2025-02-13 RX ADMIN — LIDOCAINE HYDROCHLORIDE 3 ML: 20 INJECTION, SOLUTION INFILTRATION; PERINEURAL at 12:46

## 2025-02-13 RX ADMIN — DEXAMETHASONE SODIUM PHOSPHATE 10 MG: 4 INJECTION INTRA-ARTICULAR; INTRALESIONAL; INTRAMUSCULAR; INTRAVENOUS; SOFT TISSUE at 12:46

## 2025-02-13 RX ADMIN — MIDAZOLAM HYDROCHLORIDE 2 MG: 1 INJECTION, SOLUTION INTRAMUSCULAR; INTRAVENOUS at 12:44

## 2025-02-13 RX ADMIN — ONDANSETRON 4 MG: 2 INJECTION INTRAMUSCULAR; INTRAVENOUS at 12:49

## 2025-02-13 RX ADMIN — SODIUM CHLORIDE: 9 INJECTION, SOLUTION INTRAVENOUS at 11:11

## 2025-02-13 RX ADMIN — PROPOFOL 20 MG: 10 INJECTION, EMULSION INTRAVENOUS at 13:00

## 2025-02-13 RX ADMIN — PROPOFOL 50 MG: 10 INJECTION, EMULSION INTRAVENOUS at 12:46

## 2025-02-13 RX ADMIN — PROPOFOL INJECTABLE EMULSION 50 MCG/KG/MIN: 10 INJECTION, EMULSION INTRAVENOUS at 12:46

## 2025-02-13 RX ADMIN — PROPOFOL 20 MG: 10 INJECTION, EMULSION INTRAVENOUS at 13:22

## 2025-02-13 RX ADMIN — FENTANYL CITRATE 50 MCG: 50 INJECTION INTRAMUSCULAR; INTRAVENOUS at 12:45

## 2025-02-13 SDOH — SOCIAL STABILITY: SOCIAL INSECURITY: HOW OFTEN DOES ANYONE, INCLUDING FAMILY AND FRIENDS, THREATEN YOU WITH HARM?: NEVER

## 2025-02-13 SDOH — SOCIAL STABILITY: SOCIAL INSECURITY: HOW OFTEN DOES ANYONE, INCLUDING FAMILY AND FRIENDS, INSULT OR TALK DOWN TO YOU?: NEVER

## 2025-02-13 SDOH — SOCIAL STABILITY: SOCIAL INSECURITY: HOW OFTEN DOES ANYONE, INCLUDING FAMILY AND FRIENDS, PHYSICALLY HURT YOU?: NEVER

## 2025-02-13 SDOH — SOCIAL STABILITY: SOCIAL INSECURITY: RISK FACTORS: LIVER DISEASE

## 2025-02-13 SDOH — SOCIAL STABILITY: SOCIAL INSECURITY: RISK FACTORS: HEART DISEASE

## 2025-02-13 SDOH — SOCIAL STABILITY: SOCIAL INSECURITY: RISK FACTORS: AGE

## 2025-02-13 SDOH — SOCIAL STABILITY: SOCIAL INSECURITY: HOW OFTEN DOES ANYONE, INCLUDING FAMILY AND FRIENDS, SCREAM OR CURSE AT YOU?: NEVER

## 2025-02-13 SDOH — SOCIAL STABILITY: SOCIAL NETWORK
HOW OFTEN DO YOU SEE OR TALK TO PEOPLE THAT YOU CARE ABOUT AND FEEL CLOSE TO? (FOR EXAMPLE: TALKING TO FRIENDS ON THE PHONE, VISITING FRIENDS OR FAMILY, GOING TO CHURCH OR CLUB MEETINGS): 3 TO 5 TIMES A WEEK

## 2025-02-13 ASSESSMENT — ACTIVITIES OF DAILY LIVING (ADL)
ADL_SCORE: 12
ADL_BEFORE_ADMISSION: INDEPENDENT
RECENT_DECLINE_ADL: NO

## 2025-02-13 ASSESSMENT — PAIN SCALES - GENERAL
PAINLEVEL_OUTOF10: 0
PAINLEVEL_OUTOF10: 0

## 2025-02-13 ASSESSMENT — LIFESTYLE VARIABLES: HOW OFTEN DO YOU HAVE A DRINK CONTAINING ALCOHOL: MONTHLY OR LESS

## 2025-02-13 ASSESSMENT — PAIN SCALES - WONG BAKER: WONGBAKER_NUMERICALRESPONSE: 0

## 2025-02-17 ENCOUNTER — LAB SERVICES (OUTPATIENT)
Dept: LAB | Age: 70
End: 2025-02-17

## 2025-02-17 ENCOUNTER — APPOINTMENT (OUTPATIENT)
Dept: ULTRASOUND IMAGING | Age: 70
End: 2025-02-17
Attending: FAMILY MEDICINE

## 2025-02-17 DIAGNOSIS — K76.0 FATTY LIVER: ICD-10-CM

## 2025-02-17 DIAGNOSIS — R42 CHRONIC VERTIGO: ICD-10-CM

## 2025-02-17 DIAGNOSIS — K21.9 GASTROESOPHAGEAL REFLUX DISEASE WITHOUT ESOPHAGITIS: ICD-10-CM

## 2025-02-17 DIAGNOSIS — I10 PRIMARY HYPERTENSION: ICD-10-CM

## 2025-02-17 DIAGNOSIS — R92.8 ABNORMAL MAMMOGRAM: ICD-10-CM

## 2025-02-17 DIAGNOSIS — Z00.00 MEDICARE ANNUAL WELLNESS VISIT, SUBSEQUENT: ICD-10-CM

## 2025-02-17 DIAGNOSIS — G57.93 NEUROPATHY OF BOTH FEET: ICD-10-CM

## 2025-02-17 DIAGNOSIS — F41.1 GAD (GENERALIZED ANXIETY DISORDER): ICD-10-CM

## 2025-02-17 DIAGNOSIS — Z12.31 VISIT FOR SCREENING MAMMOGRAM: Primary | ICD-10-CM

## 2025-02-17 DIAGNOSIS — R73.01 IMPAIRED FASTING GLUCOSE: ICD-10-CM

## 2025-02-17 DIAGNOSIS — E78.49 OTHER HYPERLIPIDEMIA: ICD-10-CM

## 2025-02-17 LAB
ALBUMIN SERPL-MCNC: 3.4 G/DL (ref 3.4–5)
ALBUMIN/GLOB SERPL: 1 {RATIO} (ref 1–2.4)
ALP SERPL-CCNC: 139 UNITS/L (ref 45–117)
ALT SERPL-CCNC: 34 UNITS/L
ANION GAP SERPL CALC-SCNC: 13 MMOL/L (ref 7–19)
AST SERPL-CCNC: 28 UNITS/L
BASOPHILS # BLD: 0 K/MCL (ref 0–0.3)
BASOPHILS NFR BLD: 1 %
BILIRUB SERPL-MCNC: 0.3 MG/DL (ref 0.2–1)
BUN SERPL-MCNC: 15 MG/DL (ref 6–20)
BUN/CREAT SERPL: 19 (ref 7–25)
CALCIUM SERPL-MCNC: 9.2 MG/DL (ref 8.4–10.2)
CHLORIDE SERPL-SCNC: 102 MMOL/L (ref 97–110)
CHOLEST SERPL-MCNC: 197 MG/DL
CHOLEST/HDLC SERPL: 2.7 {RATIO}
CO2 SERPL-SCNC: 30 MMOL/L (ref 21–32)
CREAT SERPL-MCNC: 0.79 MG/DL (ref 0.51–0.95)
DEPRECATED RDW RBC: 41 FL (ref 39–50)
EGFRCR SERPLBLD CKD-EPI 2021: 80 ML/MIN/{1.73_M2}
EOSINOPHIL # BLD: 0.1 K/MCL (ref 0–0.5)
EOSINOPHIL NFR BLD: 2 %
ERYTHROCYTE [DISTWIDTH] IN BLOOD: 11.6 % (ref 11–15)
FASTING DURATION TIME PATIENT: ABNORMAL H
GLOBULIN SER-MCNC: 3.3 G/DL (ref 2–4)
GLUCOSE SERPL-MCNC: 86 MG/DL (ref 70–99)
HBA1C MFR BLD: 5.3 % (ref 4.5–5.6)
HCT VFR BLD CALC: 42.2 % (ref 36–46.5)
HDLC SERPL-MCNC: 74 MG/DL
HGB BLD-MCNC: 13.9 G/DL (ref 12–15.5)
IMM GRANULOCYTES # BLD AUTO: 0 K/MCL (ref 0–0.2)
IMM GRANULOCYTES # BLD: 0 %
LDLC SERPL CALC-MCNC: 69 MG/DL
LYMPHOCYTES # BLD: 2.8 K/MCL (ref 1–4)
LYMPHOCYTES NFR BLD: 43 %
MCH RBC QN AUTO: 31.4 PG (ref 26–34)
MCHC RBC AUTO-ENTMCNC: 32.9 G/DL (ref 32–36.5)
MCV RBC AUTO: 95.3 FL (ref 78–100)
MONOCYTES # BLD: 0.8 K/MCL (ref 0.3–0.9)
MONOCYTES NFR BLD: 12 %
NEUTROPHILS # BLD: 2.9 K/MCL (ref 1.8–7.7)
NEUTROPHILS NFR BLD: 42 %
NONHDLC SERPL-MCNC: 123 MG/DL
PLATELET # BLD AUTO: 249 K/MCL (ref 140–450)
POTASSIUM SERPL-SCNC: 4.2 MMOL/L (ref 3.4–5.1)
PROT SERPL-MCNC: 6.7 G/DL (ref 6.4–8.2)
RBC # BLD: 4.43 MIL/MCL (ref 4–5.2)
SODIUM SERPL-SCNC: 141 MMOL/L (ref 135–145)
T4 FREE SERPL-MCNC: 0.8 NG/DL (ref 0.8–1.5)
TRIGL SERPL-MCNC: 268 MG/DL
TSH SERPL-ACNC: 7.57 MCUNITS/ML (ref 0.35–5)
WBC # BLD: 6.6 K/MCL (ref 4.2–11)

## 2025-02-17 PROCEDURE — 83036 HEMOGLOBIN GLYCOSYLATED A1C: CPT | Performed by: INTERNAL MEDICINE

## 2025-02-17 PROCEDURE — 76642 ULTRASOUND BREAST LIMITED: CPT | Performed by: FAMILY MEDICINE

## 2025-02-17 PROCEDURE — 84443 ASSAY THYROID STIM HORMONE: CPT | Performed by: INTERNAL MEDICINE

## 2025-02-17 PROCEDURE — 80061 LIPID PANEL: CPT | Performed by: INTERNAL MEDICINE

## 2025-02-17 PROCEDURE — 36415 COLL VENOUS BLD VENIPUNCTURE: CPT | Performed by: FAMILY MEDICINE

## 2025-02-17 PROCEDURE — 80053 COMPREHEN METABOLIC PANEL: CPT | Performed by: INTERNAL MEDICINE

## 2025-02-17 PROCEDURE — 85025 COMPLETE CBC W/AUTO DIFF WBC: CPT | Performed by: INTERNAL MEDICINE

## 2025-02-17 PROCEDURE — 84439 ASSAY OF FREE THYROXINE: CPT | Performed by: INTERNAL MEDICINE

## 2025-02-18 ENCOUNTER — CLINICAL DOCUMENTATION (OUTPATIENT)
Dept: GASTROENTEROLOGY | Age: 70
End: 2025-02-18

## 2025-02-18 LAB
ASR DISCLAIMER: NORMAL
CASE RPRT: NORMAL
CLINICAL INFO: NORMAL
PATH REPORT.FINAL DX SPEC: NORMAL
PATH REPORT.FINAL DX SPEC: NORMAL
PATH REPORT.GROSS SPEC: NORMAL

## 2025-02-19 ENCOUNTER — APPOINTMENT (OUTPATIENT)
Dept: CARDIOLOGY | Age: 70
End: 2025-02-19
Attending: INTERNAL MEDICINE

## 2025-02-20 ENCOUNTER — TELEPHONE (OUTPATIENT)
Dept: CARDIOLOGY | Age: 70
End: 2025-02-20

## 2025-04-23 DIAGNOSIS — F41.1 GAD (GENERALIZED ANXIETY DISORDER): ICD-10-CM

## 2025-04-23 RX ORDER — ESCITALOPRAM OXALATE 10 MG/1
10 TABLET ORAL DAILY
Qty: 90 TABLET | Refills: 1 | Status: SHIPPED | OUTPATIENT
Start: 2025-04-23

## 2025-05-06 DIAGNOSIS — K21.9 GASTROESOPHAGEAL REFLUX DISEASE WITHOUT ESOPHAGITIS: ICD-10-CM

## 2025-05-06 DIAGNOSIS — R11.0 CHRONIC NAUSEA: ICD-10-CM

## 2025-05-06 RX ORDER — PANTOPRAZOLE SODIUM 40 MG/1
40 TABLET, DELAYED RELEASE ORAL DAILY
Qty: 90 TABLET | Refills: 3 | Status: SHIPPED | OUTPATIENT
Start: 2025-05-06

## 2025-05-21 ENCOUNTER — APPOINTMENT (OUTPATIENT)
Dept: CARDIOLOGY | Age: 70
End: 2025-05-21
Attending: INTERNAL MEDICINE

## 2025-05-21 DIAGNOSIS — E78.49 OTHER HYPERLIPIDEMIA: ICD-10-CM

## 2025-05-21 DIAGNOSIS — I65.23 BILATERAL CAROTID ARTERY STENOSIS: ICD-10-CM

## 2025-05-21 PROCEDURE — 93880 EXTRACRANIAL BILAT STUDY: CPT | Performed by: INTERNAL MEDICINE

## 2025-05-26 ENCOUNTER — RESULTS FOLLOW-UP (OUTPATIENT)
Dept: CARDIOLOGY | Age: 70
End: 2025-05-26

## 2025-07-14 RX ORDER — ATORVASTATIN CALCIUM 80 MG/1
80 TABLET, FILM COATED ORAL DAILY
Qty: 90 TABLET | Refills: 0 | Status: SHIPPED | OUTPATIENT
Start: 2025-07-14

## 2025-07-29 DIAGNOSIS — G57.93 NEUROPATHY OF BOTH FEET: ICD-10-CM

## 2025-07-29 DIAGNOSIS — I10 PRIMARY HYPERTENSION: ICD-10-CM

## 2025-07-29 RX ORDER — AMITRIPTYLINE HYDROCHLORIDE 10 MG/1
10 TABLET ORAL NIGHTLY
Qty: 30 TABLET | Refills: 0 | Status: SHIPPED | OUTPATIENT
Start: 2025-07-29

## 2025-07-29 RX ORDER — LOSARTAN POTASSIUM 50 MG/1
50 TABLET ORAL DAILY
Qty: 90 TABLET | Refills: 3 | Status: SHIPPED | OUTPATIENT
Start: 2025-07-29

## 2025-08-14 ENCOUNTER — APPOINTMENT (OUTPATIENT)
Dept: FAMILY MEDICINE | Age: 70
End: 2025-08-14

## 2025-08-14 ENCOUNTER — LAB SERVICES (OUTPATIENT)
Dept: LAB | Age: 70
End: 2025-08-14

## 2025-08-14 VITALS
DIASTOLIC BLOOD PRESSURE: 88 MMHG | SYSTOLIC BLOOD PRESSURE: 136 MMHG | HEIGHT: 67 IN | OXYGEN SATURATION: 96 % | WEIGHT: 167 LBS | HEART RATE: 74 BPM | TEMPERATURE: 97 F | RESPIRATION RATE: 16 BRPM | BODY MASS INDEX: 26.21 KG/M2

## 2025-08-14 DIAGNOSIS — K76.0 FATTY LIVER: ICD-10-CM

## 2025-08-14 DIAGNOSIS — E78.49 OTHER HYPERLIPIDEMIA: ICD-10-CM

## 2025-08-14 DIAGNOSIS — R79.89 ABNORMAL TSH: ICD-10-CM

## 2025-08-14 DIAGNOSIS — G57.93 NEUROPATHY OF BOTH FEET: Primary | ICD-10-CM

## 2025-08-14 DIAGNOSIS — I10 PRIMARY HYPERTENSION: ICD-10-CM

## 2025-08-14 DIAGNOSIS — G57.93 NEUROPATHY OF BOTH FEET: ICD-10-CM

## 2025-08-14 LAB
ALBUMIN SERPL-MCNC: 3.6 G/DL (ref 3.4–5)
ALBUMIN/GLOB SERPL: 1.1 {RATIO} (ref 1–2.4)
ALP SERPL-CCNC: 111 UNITS/L (ref 45–117)
ALT SERPL-CCNC: 21 UNITS/L
ANION GAP SERPL CALC-SCNC: 13 MMOL/L (ref 7–19)
AST SERPL-CCNC: 23 UNITS/L
BILIRUB SERPL-MCNC: 0.5 MG/DL (ref 0.2–1)
BUN SERPL-MCNC: 13 MG/DL (ref 6–20)
BUN/CREAT SERPL: 16 (ref 7–25)
CALCIUM SERPL-MCNC: 9.3 MG/DL (ref 8.4–10.2)
CHLORIDE SERPL-SCNC: 104 MMOL/L (ref 97–110)
CO2 SERPL-SCNC: 30 MMOL/L (ref 21–32)
CREAT SERPL-MCNC: 0.79 MG/DL (ref 0.51–0.95)
EGFRCR SERPLBLD CKD-EPI 2021: 80 ML/MIN/{1.73_M2}
FASTING DURATION TIME PATIENT: ABNORMAL H
GLOBULIN SER-MCNC: 3.4 G/DL (ref 2–4)
GLUCOSE SERPL-MCNC: 107 MG/DL (ref 70–99)
POTASSIUM SERPL-SCNC: 4.3 MMOL/L (ref 3.4–5.1)
PROT SERPL-MCNC: 7 G/DL (ref 6.4–8.2)
SODIUM SERPL-SCNC: 143 MMOL/L (ref 135–145)
T3 SERPL-MCNC: 0.71 NG/ML (ref 0.6–1.81)
TSH SERPL-ACNC: 2.19 MCUNITS/ML (ref 0.35–5)
VIT B12 SERPL-MCNC: 410 PG/ML (ref 211–911)

## 2025-08-14 ASSESSMENT — PATIENT HEALTH QUESTIONNAIRE - PHQ9
1. LITTLE INTEREST OR PLEASURE IN DOING THINGS: NOT AT ALL
2. FEELING DOWN, DEPRESSED OR HOPELESS: NOT AT ALL
SUM OF ALL RESPONSES TO PHQ9 QUESTIONS 1 AND 2: 0
SUM OF ALL RESPONSES TO PHQ9 QUESTIONS 1 AND 2: 0
CLINICAL INTERPRETATION OF PHQ2 SCORE: NO FURTHER SCREENING NEEDED

## 2025-08-18 ENCOUNTER — APPOINTMENT (OUTPATIENT)
Dept: MAMMOGRAPHY | Age: 70
End: 2025-08-18
Attending: FAMILY MEDICINE

## 2025-08-19 ENCOUNTER — APPOINTMENT (OUTPATIENT)
Dept: CARDIOLOGY | Age: 70
End: 2025-08-19

## 2026-02-03 ENCOUNTER — APPOINTMENT (OUTPATIENT)
Dept: FAMILY MEDICINE | Age: 71
End: 2026-02-03